# Patient Record
Sex: MALE | Race: WHITE
[De-identification: names, ages, dates, MRNs, and addresses within clinical notes are randomized per-mention and may not be internally consistent; named-entity substitution may affect disease eponyms.]

---

## 2017-01-23 ENCOUNTER — HOSPITAL ENCOUNTER (INPATIENT)
Dept: HOSPITAL 80 - FED | Age: 72
LOS: 9 days | Discharge: NURSING, REHAB OR LONG-TERM CARE | DRG: 519 | End: 2017-02-01
Attending: NEUROLOGICAL SURGERY | Admitting: NEUROLOGICAL SURGERY
Payer: COMMERCIAL

## 2017-01-23 DIAGNOSIS — M80.08XA: Primary | ICD-10-CM

## 2017-01-23 DIAGNOSIS — I10: ICD-10-CM

## 2017-01-23 DIAGNOSIS — E78.5: ICD-10-CM

## 2017-01-23 DIAGNOSIS — E11.621: ICD-10-CM

## 2017-01-23 DIAGNOSIS — M86.9: ICD-10-CM

## 2017-01-23 DIAGNOSIS — Z99.81: ICD-10-CM

## 2017-01-23 DIAGNOSIS — E11.69: ICD-10-CM

## 2017-01-23 DIAGNOSIS — I48.91: ICD-10-CM

## 2017-01-23 DIAGNOSIS — J96.11: ICD-10-CM

## 2017-01-23 DIAGNOSIS — L97.429: ICD-10-CM

## 2017-01-23 DIAGNOSIS — B95.62: ICD-10-CM

## 2017-01-23 DIAGNOSIS — J44.9: ICD-10-CM

## 2017-01-23 DIAGNOSIS — M48.06: ICD-10-CM

## 2017-01-23 LAB
ANION GAP SERPL CALC-SCNC: 10 MEQ/L (ref 8–16)
CALCIUM SERPL-MCNC: 8.9 MG/DL (ref 8.5–10.4)
CHLORIDE SERPL-SCNC: 101 MEQ/L (ref 97–110)
CO2 SERPL-SCNC: 27 MEQ/L (ref 22–31)
CREAT SERPL-MCNC: 0.9 MG/DL (ref 0.7–1.3)
ERYTHROCYTE [DISTWIDTH] IN BLOOD BY AUTOMATED COUNT: 15 % (ref 11.5–15.2)
GFR SERPL CREATININE-BSD FRML MDRD: > 60 ML/MIN/{1.73_M2}
GLUCOSE SERPL-MCNC: 114 MG/DL (ref 70–100)
HCT VFR BLD CALC: 27.4 % (ref 40–51)
HGB BLD-MCNC: 9 G/DL (ref 13.7–17.5)
LIPEMIA HEMOLYSIS FLAG: 80 (ref 0–99)
MCH RBC BLDCO QN: 31.4 PG (ref 27.9–34.1)
MCHC RBC AUTO-ENTMCNC: 32.8 G/DL (ref 32.4–36.7)
MCV RBC AUTO: 95.5 FL (ref 81.5–99.8)
PLATELET # BLD: 222 10^3/UL (ref 150–400)
PLATELET CLUMPS FLAG: 0 (ref 0–99)
POTASSIUM SERPL-SCNC: 4.2 MEQ/L (ref 3.5–5.2)
RBC # BLD AUTO: 2.87 10^6/UL (ref 4.4–6.38)
SODIUM SERPL-SCNC: 138 MEQ/L (ref 134–144)

## 2017-01-23 PROCEDURE — C1894 INTRO/SHEATH, NON-LASER: HCPCS

## 2017-01-23 PROCEDURE — C1713 ANCHOR/SCREW BN/BN,TIS/BN: HCPCS

## 2017-01-23 RX ADMIN — FLUTICASONE PROPIONATE AND SALMETEROL SCH: 50; 500 POWDER RESPIRATORY (INHALATION) at 22:17

## 2017-01-23 RX ADMIN — ERTAPENEM SODIUM SCH MLS: 1 INJECTION, POWDER, LYOPHILIZED, FOR SOLUTION INTRAMUSCULAR; INTRAVENOUS at 21:55

## 2017-01-23 RX ADMIN — ATORVASTATIN CALCIUM SCH MG: 20 TABLET, FILM COATED ORAL at 22:08

## 2017-01-23 RX ADMIN — IPRATROPIUM BROMIDE AND ALBUTEROL SULFATE SCH ML: .5; 3 SOLUTION RESPIRATORY (INHALATION) at 22:13

## 2017-01-23 RX ADMIN — DOCUSATE SODIUM AND SENNOSIDES SCH: 50; 8.6 TABLET ORAL at 22:42

## 2017-01-23 RX ADMIN — Medication SCH UNITS: at 22:07

## 2017-01-23 RX ADMIN — LOSARTAN POTASSIUM SCH MG: 50 TABLET, FILM COATED ORAL at 22:08

## 2017-01-23 NOTE — GCON
[f rep st]



                                                                    CONSULTATION





DATE OF CONSULTATION:  01/23/2017



REFERRING PHYSICIAN:  Dale Basilio MD



REASON FOR CONSULTATION:  Management of medical issues.



HISTORY OF PRESENT ILLNESS:  Patient is a 71-year-old male with history of prior lumbar spine decompr
ession, prediabetes, COPD on chronic oxygen 4 L, presenting with increased back pain.  States he fell
 out of a chair reaching for something on the floor on the evening of the 20th and fell on his right 
side.  He did not hit his head or have loss of consciousness.  He denies overt weakness.  No bowel or
 bladder incontinence.  He had increased pain on the right side the subsequent day that was more mode
rate.  When asked to describe the pain, he could not tell me.  This pain does radiate down the right 
leg and worse with movements.  Minimal pain if he is lying still. 



He has been recently followed by Dr. Sykes with Infectious Disease for a diabetic foot ulcer, in [place]
ch he has had a PICC line and been receiving Invanz.  Patient denies fevers, chills, or sweats.  No c
ough.  No shortness of breath.  No myalgias.  No ill contacts.



REVIEW OF SYSTEMS:  I completed a 10-point review of systems, negative except noted in HPI.



PAST MEDICAL HISTORY:  

1.  Left foot cellulitis, currently treated with Invanz.

2.  COPD.

3.  Chronic hypoxemic respiratory failure with 4 L all the time.  

4.  Prediabetes.

5.  History of atrial fibrillation.

6.  History of gout.



PAST SURGICAL HISTORY:  Decompression lumbar spine, nasoseptal surgery, soft palate surgery, 2 prior 
lumbar surgeries,  vasectomy.



SOCIAL HISTORY:  Lives in [place] with his wife.  He has 2 kids.  He has smoked 2 packs a day for 
40 years, has 2 alcoholic drinks every evening, and no illicits.



FAMILY HISTORY:  Brother with diabetes.



ALLERGIES:  No known drug allergies.



MEDICATIONS ON ADMISSION:  Metformin 1000 mg b.i.d., guaifenesin p.r.n., Accolate 20 mg p.o. b.i.d., 
vancomycin 1.25 g IV q.24, simvastatin 40 mg daily, prednisone 10 mg daily, potassium 20 mEq daily, P
rotonix 40 mg daily, losartan 50 mg p.o. q.h.s., DuoNeb q.i.d., Norco 5/325 one tab p.o. q.4 hours p.
r.n., Lasix 40 mg daily, Advair 1 puff b.i.d., iron 325 mg daily, ertapenem 1 g daily. aspirin 81 mg 
daily, allopurinol 100 mg daily, albuterol p.r.n., Tylenol p.r.n.



PHYSICAL EXAMINATION:  VITAL SIGNS:  Temperature 36.6, blood pressure 143/79, heart rate 83, respirat
ions 16, and 96% on 2 L.  GENERAL:  Patient is sitting up in bed, in no acute distress.  HEENT: PERRL
A, EOMI.  Oropharynx is clear without exudate or ulceration.  CV:  Regular rate rhythm.  No murmurs, 
gallops, or rubs.  LUNGS:  Diminished throughout.  No crackles or wheezes.  ABDOMEN:  Soft, nontender
, nondistended.  Positive bowel sounds.  :  No suprapubic or CVA tenderness.  No Yoo in place.  M
USCULOSKELETAL:  Right upper extremity PICC line without evidence of infection or erythema.  Left low
er extremity with erythema up to the shin with warmth, not tender.  Two ulcers, 1 on the medial aspec
t of the big toe was packed, heel ulcer is healing well with no purulence.



IMAGING:  Chest x-ray, personally reviewed by me:  Small right-sided pleural effusion, query right-si
ded opacity, basilar consolidation consistent with chronic scarring.  Lumbar spine MRI:  Significant 
interval progression in osteoporotic compression fractures from 1 year earlier.  There has been inter
tommy development of severe compression deformity of L4 with associated severe acquired central canal s
tenosis.  Severe compression fracture of L1 is associated with moderate acquired central canal stenos
is.  Acute superior endplate fracture of L5.  Moderate remote compression fracture in the superior en
dplate of L3 and T11.



ASSESSMENT AND PLAN:  

1.  Acute on chronic lumbar pain:  Patient presented with progressive pain with right lower extremity
 paresthesia and weakness.  He has acute deformities of L4 and superior endplate of L5.  Neurosurgery
 plans for L3 through L5 lumbar laminectomy on January 26th, as well as kyphoplasty at L4-L5.  Pain m
anagement per Neurosurgery.  Will hold his aspirin.

2.  History of atrial fibrillation:  Patient is currently in normal sinus rhythm upon my exam.  EKG i
s pending.  He is  anticoagulated with a baby aspirin.  This has been held with upcoming surgery.

3.  Accelerated hypertension:  Will resume losartan and Lasix, but would recommend holding these  day
 of surgery to avoid perioperative hypotension.

4.  Hyperlipidemia:  Continue Zocor.

5.  Left diabetic foot ulcer:  Patient is followed by Dino Sykes with Infectious Disease.  He has be
en receiving vancomycin and meropenem as an outpatient.  Will continue these medications.

6.  Gout:  Continue allopurinol.  Labs are pending.  BMP and CBC are pending.

7.  Controlled diabetes:  Patient on metformin as an outpatient.  Will hold this with upcoming proced
ure and use sliding scale insulin.

8.  Chronic obstructive pulmonary disease:  No evidence of exacerbation.  Will continue home medicati
ons.  Continue inhalers, as well as his prednisone.

9.  Chronic hypoxemic respiratory failure:  Patient normally wears 4 L of oxygen in which now he has 
only requiring 2.  Chest x-ray showed a small pleural effusion and possible pneumonia however, patien
t is asymptomatic without fever.  Labs are pending.  Do not think this is infectious at this point.

10.  Diet:  Diabetic.

11.  Deep venous thrombosis prophylaxis:  SCDs. 

Thank you for this consult.  We will follow along.  Please call with any questions.





Job #:  [id number]

## 2017-01-23 NOTE — DX
Portable Chest   17:08



History: Evaluate pneumonia for pleural effusion



Comparison: June 22, 2016



Findings: There is new patchy right lung infiltrate. Bibasilar consolidation is chronic or recurrent 
and associated with increased blunting of the right costophrenic gutter that likely represents a smal
l pleural effusion. A right arm PICC line is newly present with tip in the superior vena cava. Prior 
left mid lung consolidation has cleared.



Impression: New right lung pneumonia with small right pleural effusion. Basilar consolidation consist
ent with chronic scarring/atelectasis versus recurrent pneumonia.

## 2017-01-23 NOTE — EDPHY
H & P


Stated Complaint: chronic back pain/? fall friday may have reinjured


Source: Patient, Family


Exam Limitations: No limitations





- Personal History


Current Tetanus/Diphtheria Vaccine: Yes


Tetanus Vaccine Date: 2012





- Medical/Surgical History


Hx Asthma: Yes


Hx Chronic Respiratory Disease: Yes


Hx Diabetes: Yes


Hx Cardiac Disease: Yes


Hx Renal Disease: No


Hx Cirrhosis: No


Hx Alcoholism: No


Hx HIV/AIDS: No


Hx Splenectomy or Spleen Trauma: No


Other PMH: nose/vasectomy/ COPD/toe infection/chronic back probl/back surgery





- Social History


Smoking Status: Former smoker


HPI/ROS: 


CHIEF COMPLAINT: Back pain





HISTORY OF PRESENT ILLNESS:  fell on Friday evening injuring his  low back.  At 

that time he had minimal pain.  He had no head injury or loss of consciousness.

  Describes no motor or sensory changes.  However on Saturday he noticed 

increasing pain.  Since that time his pain is increased from mild to moderate.  

It is worse with any palpation of lumbar region.  It radiates down the right 

leg.  Some paresthesia of the right leg.    Worse with palpation or movement.  

Improved with rest. No incontinence of bowel or bladder.  No retention of bowel 

or bladder.  No anesthesia of the leg or motor changes.  Does have a history of 

low back pain status post discopathy with uncertain surgeries in the past.  He 

knows he has no hardware in there.  No injuries elsewhere.  He also has an 

ongoing foot infection that he has no complaints for but is asking to have his 

scheduled antibiotics administered  through his existing PICC line.  He has no 

complaints regarding the foot.  No other associated complaints or modifying 

factors.





REVIEW OF SYSTEMS:


Ten systems reviewed and are negative unless otherwise noted in the HPI





EXAMINATION


General Appearance:  Alert, no distress


Head: normocephalic, atraumatic


Eyes:  Pupils equal and round, no conjunctival pallor or injection


ENT, Mouth:  Mucous membranes moist .  Uvula midline. No lesions.


Neck:  Normal inspection, supple, non-tender


Respiratory:   mild rhonchi.  No wheezing, crackles or consolidation.  No 

distress or diminishment


Cardiovascular:  Regular rate and rhythm.  Pulses intact distally with 

symmetric PT and radial pulses.  Left DP not evaluated due to bandage


Gastrointestinal:  Abdomen is soft and nontender.  Mild, reducible umbilical 

hernia.  Nontender in all quadrants.  Nonacute


Back:  moderate tenderness of the lumbar spine.  No crepitus, step-off or 

deformity.  Range of motion not fully tested secondary to pain


Neurological:  A&O,  paresthesia of the right lower extremity.  Strength is 5/5 

in the right ankle, knee and hip.  No focal deficits.


Skin:    Skin changes of bilateral lower extremities consistent with venous 

stasis.  There is a wound on the left foot that I did not examine due to clean 

bandage.  He has no complaints regarding this.


Extremities:  Nontender,   Symmetric 1+ pedal edema


Psychiatric:  Mood and affect normal





DIFFERENTIAL DIAGNOSES:


Including but not limited to 





MDM:


11:00 a.m.


 fall with lower back pain, right-sided radiculopathy and mild hyper 

paresthesia.  He has no weakness of the right lower extremity.  No incontinence 

of bowel or bladder.  No retention of bowel or bladder.  No paralysis.  Given 

his age, chronicity of back pain, in the presence of an fall with symptoms as 

above, have gone straight to MRI of the low back.  Patient's bowels are 

comfortable with this plan.  Additionally he has ongoing infection of the foot, 

the PICC line in place.  I have given any verbal for him to receive his 

scheduled medications.  We will give him pain medication to get the MRI. he is 

also comfortable with this plan.





12:52 p.m.


 notified by Radiology that there are significant findings on the MRI as listed 

in his report.  The most acute L5 fracture with severe stenosis of the canal 

due to retropulsion.  There are changes at L1, L3 as for as well as noted.  We 

did review these.  I did immediately paged Neurosurgery at 12:56 p.m..  We are 

waiting their call this time.





  1:23 p.m.


 spoke with ARMANDO Crawford,  with Neurosurgery.  She was made aware of the 

significant findings on the MRI of the lumbar spine.  She informed that she 

will have someone come see the patient here in the emergency department.





1:42 p.m.


 Dr. Basilio at bedside examining the patient at this time.





  2:20 p.m.


 notified by Neurosurgery that they will admit the patient due to these 

findings.  They are requesting medicine consult as the plan for surgery on 

Friday.  I discussed the case with the hospitalist, and Dr. Betancourt will 

provide consultation.  He is admitted in stable condition











SUPERVISION: This patient was independently evaluated without the aide of 

supervising physician. (Shyam Read)





Constitutional: 





 Initial Vital Signs











Temperature (C)  36.4 C   01/23/17 09:57


 


Heart Rate  86   01/23/17 09:57


 


Respiratory Rate  22 H  01/23/17 09:57


 


Blood Pressure  151/78 H  01/23/17 09:57


 


O2 Sat (%)  90 L  01/23/17 09:57








 











O2 Delivery Mode               Nasal Cannula

















Allergies/Adverse Reactions: 


 





No Known Allergies Allergy (Verified 01/23/17 09:56)


 








Home Medications: 














 Medication  Instructions  Recorded


 


Prednisone 10 mg PO DAILY 07/28/11


 


SIMVASTATIN [Zocor] 40 mg PO HS 07/28/11


 


Zafirlukast [Accolate 20 MG] 20 mg PO BID@1000,2000 07/28/11


 


metFORMIN HCL [Glucophage 500 mg 1,000 mg PO BID 07/28/11





(*)]  


 


Aspirin [Aspirin 81mg (*)] 81 mg PO HS 07/25/15


 


Cholecalciferol (Vitamin D3) 4,000 unit PO HS 07/25/15





[Vitamin D3]  


 


Fluticasone/Salmeter 500/50Mcg 1 puffs IH BID 07/25/15





[Advair 500/50 (*)]  


 


Albuterol [Proventil Inhaler HFA 1 - 2 puffs IH DAILY PRN 12/16/16





(*)]  


 


Allopurinol [Allopurinol 100  mg PO DAILY 12/16/16





(*)]  


 


Ferrous Sulfate [Ferrous Sulf 325 325 mg PO DAILY 12/16/16





MG (*)]  


 


Furosemide [Lasix 40 MG (*)] 40 mg PO DAILY 12/16/16


 


Ipratropium/Albuterol [Duoneb (*)] 3 ml IH QID 12/16/16


 


Losartan Potassium [Cozaar 50 mg 50 mg PO HS 12/16/16





(*)]  


 


Pantoprazole Sodium [Protonix 40mg 40 mg PO DAILY 12/16/16





(*)]  


 


Potassium Cl [Klor-Con 20 meq (*)] 20 meq PO DAILY 12/16/16


 


guaiFENesin [Mucinex 600 MG (*)] 1,200 mg PO DAILY PRN 12/16/16


 


Acetaminophen [Tylenol 325mg (*)] 650 mg PO Q4HRS PRN #0 tab 12/18/16


 


Ertapenem [INVanz] 1 gm IV DAILY #0 vial 12/18/16


 


Hydrocodone/APAP 5/325 [Norco 1 tab PO Q4HRS PRN #20 tab 12/18/16





5/325 (*)]  


 


Vancomycin [Vancomycin (*)] 1.25 gm IV Q24H #0 vial 12/18/16














Medical Decision Making


Other Provider: 


The patient was evaluated and managed by the Physician Assistant/ Nurse 

Practitioner.  I discussed the patient's presentation and course with the 

midlevel provider with them and agree with the evaluation.  My co-signature 

indicates that I have reviewed this chart and I agree with the findings and 

plan of care as documented.  I am the secondary supervising physician. (Bonnie Baker)








- Data Points


Medications Given: 





 








Discontinued Medications





Morphine Sulfate (Morphine)  4 mg IVP EDNOW ONE


   Stop: 01/23/17 11:02


   Last Admin: 01/23/17 11:34 Dose:  4 mg


Ondansetron HCl (Zofran)  4 mg IVP EDNOW ONE


   Stop: 01/23/17 11:02


   Last Admin: 01/23/17 11:35 Dose:  4 mg











Departure





- Departure


Disposition: [facility] Inpatient Acute


Clinical Impression: 


 Closed compression fracture of L1 lumbar vertebral body, Compression fracture 

of L3 lumbar vertebra, Compression fracture of L4 lumbar vertebra


Condition: Good

## 2017-01-23 NOTE — MR
MRI of the Lumbar Spine (Without Contrast)



Clinical Indications: Evaluate multiple lumbar compression fractures. Radiculopathy post trauma.



Technique:  Sagittal and axial T1 and T2 MR sequences of the lumbar spine without contrast.



Comparison examination: Lateral lumbar spine radiograph December 23, 2015.



Findings:  From prior examination, there is been significant interval change in appearance of the lum
bar spine over one year. There has been interval development of severe compression fractures of L1 an
d L4 with fractures through the centrum of both vertebral bodies. Retropulsion of bone at both levels
 contributes to moderate central stenosis at L1 and severe central stenosis at L4. A moderate debi
tevin deformity of the superior endplate of L3 is also new from the prior examination, but subacute in
 age. There is an acute mild compression deformity of the superior endplate of L4. Moderate compressi
on deformity of the T11 vertebral body appears remote in age.



L1-L2: No disk herniation. Retropulsion of bone from L1 compression deformity contributes to moderate
 acquired central canal stenosis.



L2-L3: No disk herniation or stenosis.



L3-L4: Broad-based annular bulging. Retropulsion of bone from the severe L4 compression deformity cre
ates severe acquired central canal stenosis. There is marked crowding of the cauda equina within the 
thecal space..



L4-L5: No disk herniation or stenosis.



L5-S1: No disk herniation or stenosis.



Impression:  Significant interval progression and osteoporotic compression fractures from one year ea
rlier. There has been interval development of severe compression deformity of L4 with associated asaf
re acquired central canal stenosis. Severe compression fracture of L1 is associated with moderate acq
uired central canal stenosis. Acute superior endplate fracture of L5 is present without retropulsion.
 Moderate remote compression fracture of the superior endplate of L3 and T11..



 Results called to Shyam Read PA-C, at the time of the interpretation..

## 2017-01-23 NOTE — GHP
[f 
rep st]



                                                            HISTORY AND PHYSICAL





DATE OF ADMISSION:  01/23/2017



REASON FOR EVALUATION:  Worsening low back pain with right lower extremity 
weakness and pain status post fall.



HISTORY OF PRESENT ILLNESS:  The patient is well known to me from prior lumbar 
surgery.  He comes today because he states that on the evening of the 20th January 2017 he fell onto his back.  He had minimal back pain at that time, but 
ongoing chronic low back pain as he has had for several months.  No associated 
head injury or loss of consciousness, and no loss of bowel or bladder function 
or weakness or numbness and tingling of the lower extremities.  He noticed 
increasing pain on the subsequent day that went from mild to moderate.  He had 
extremely worse pain with palpation of the lumbar spine.  The pain is radiating 
down the entire right leg in a nondermatomal distribution.  Pain is worse with 
movement and palpation and improves with rest.  No associated left lower 
extremity symptoms.  He has never had symptoms like this in the past.  He has 
been doing quite well and recently has been treated for a foot infection for 
which he has been getting treatment through his PICC line.



REVIEW OF SYSTEMS:  A 10-point review of systems on the patient intake form 
reviewed by myself, significant only for those noted above in the HPI.



ALLERGIES:  No known drug allergies.



MEDICATIONS:  

1.  Prednisone 10 mg p.o. daily.

2.  Zocor 40 mg p.o. q.h.s.

3.  Zafirlukast 20 mg p.o. b.i.d.

4.  Metformin 1000 mg p.o. b.i.d. 

5.  Aspirin 81 mg p.o. q.h.s.

6.  Vitamin D 4000 units p.o. q.h.s. 

7.  Advair 500/50 one puff inhaled b.i.d. 

8.  Albuterol 1-2 puffs daily p.r.n.

9.  Allopurinol 100 mg p.o. daily.

10.  Ferrous sulfate 325 mg p.o. daily.

11.  Lasix 40 mg p.o. daily.

12.  Albuterol 3 mL inhaled q.i.d. 

13.  Cozaar 50 mg p.o. q.h.s. 

14.  Pantoprazole 40 mg p.o. daily.

15.  Potassium chloride 20 mEq p.o. daily.

16.  Guaifenesin 1200 mg p.o. daily p.r.n.

17.  Tylenol 325 mg p.o. daily.

18.  Invanz 1 g IV daily.

19.  Norco 5/325 p.o. daily.

20.  Vancomycin 1.25 g IV q.24 hours.



FAMILY HISTORY:  Negative for any cranial aneurysms or brain tumors.



PAST MEDICAL HISTORY:  Significant for asthma, chronic respiratory disease, 
diabetes, cardiac disease.



PAST SURGICAL HISTORY:  

1.  History of prior decompression in the lumbar spine.

2.  History of toe infection with recent surgery.

3.  Vasectomy.



SOCIAL HISTORY:  The patient is  and accompanied by his wife in the room 
today.  Denies any tobacco use.



PHYSICAL EXAMINATION:  VITAL SIGNS:  Temperature 97.5, heart rate 86, 
respiratory rate 22, blood pressure 151/78, satting at 90% and currently on 2 L 
nasal cannula oxygen.  GENERAL:  The patient is laying in bed in no acute 
distress.  He is quite pleasant and cooperative with the examination.  Wife is 
at bedside.  Mood and affect are appropriate.  HEENT:  Head is atraumatic, 
normocephalic.  Pupils are equal, round, reactive to light bilaterally.  
Oropharynx is moist. CARDIOVASCULAR/PULMONARY:  Deferred.  NEUROLOGIC:  Motor 
exam:  He has 5/5 strength with bilateral  strength, biceps, triceps, 
bilateral hip flexion, knee flexion, extension, and right-sided plantar 
dorsiflexion.  Left ankle is not tested secondary to it being in a bandage from 
his prior surgery.  Sensory exam is intact with sensation to light touch 
throughout all major dermatomes of bilateral upper and lower extremities 
throughout, with diminished sensation slightly in the bilateral stocking 
distribution.  SKIN:  He has a wound on the left which is currently covered by 
a bandage which was not removed.  Other, he has no Canchola's. No Babinski.  
Other, gait and station are deferred.  Neurologic cranial nerves 2-12 are 
otherwise intact.  Face symmetric.  Tongue protrudes in midline.  Uvula and 
palate elevate symmetrically.  Symmetric sensation on the face to light touch.  
Hearing is intact to light conversation.  Shoulder shrug is symmetric.



MEDICAL DECISION MAKING:  Patient underwent an MRI scan of the lumbar spine 
without contrast completed on the 23rd of January 2017, reviewed by myself on 
the [facility] PACS system.  There is significant 
interprogression osteoporotic compression deformity with severe compression 
deformity of L4 with associated severe acquired central canal stenosis.  There 
is severe compression deformity of L1 with moderate acquired central canal 
stenosis.  There is acute superior endplate fracture of L5 without 
retropulsion.  There are remote compression deformities of the superior 
endplates of L3 and T11.



ASSESSMENT AND PLAN:  The patient is a 71-year-old gentleman with multiple 
medical comorbidities including  utilization of prednisone for chronic 
obstructive pulmonary disease, who presents now with worsening low back pain 
with right lower extremity paresthesias and weakness.  On clinical examination, 
he appears to have a normal clinical examination; though, he is having 
difficulty with ambulation secondary to both his back pain and right lower 
extremity pain.  He has acute deformities of the L4 and superior endplate of L5 
and questionable L1 endplates with severe acquired stenosis at the L4 level.  
At this point, the patient's pain is poorly controlled and he is not able to be 
ambulatory or be discharged home given his age and comorbidities as well as 
neurological findings.  At this point, we will admit him to the hospital for 
likely surgical intervention.  I discussed with him the risks, benefits, and  
treatment alternatives, including no intervention.  Will have the medicine 
service consult on the patient and get him cleared for surgery.  We have got 
him booked for surgery on the 26th of January for an L3-L5 lumbar laminectomy 
with kyphoplasty of both L4 and L5.  We will hold his aspirin in the meantime 
and have the medical service ensure that he gets his antibiotics and gets him 
teed up from a medical standpoint for surgery.  



This was discussed with the patient's wife and patient in the ER today, and 
both are in agreement.





Job #:  [id number]

MTDD

## 2017-01-24 LAB
ANION GAP SERPL CALC-SCNC: 6 MEQ/L (ref 8–16)
APTT BLD: 30.1 SEC (ref 23–38)
CALCIUM SERPL-MCNC: 8.9 MG/DL (ref 8.5–10.4)
CHLORIDE SERPL-SCNC: 103 MEQ/L (ref 97–110)
CO2 SERPL-SCNC: 29 MEQ/L (ref 22–31)
CREAT SERPL-MCNC: 0.9 MG/DL (ref 0.7–1.3)
ERYTHROCYTE [DISTWIDTH] IN BLOOD BY AUTOMATED COUNT: 14.6 % (ref 11.5–15.2)
GFR SERPL CREATININE-BSD FRML MDRD: > 60 ML/MIN/{1.73_M2}
GLUCOSE SERPL-MCNC: 72 MG/DL (ref 70–100)
HCT VFR BLD CALC: 25.7 % (ref 40–51)
HGB BLD-MCNC: 8.6 G/DL (ref 13.7–17.5)
INR PPP: 1.13 (ref 0.83–1.16)
LIPEMIA HEMOLYSIS FLAG: 80 (ref 0–99)
MCH RBC BLDCO QN: 32.3 PG (ref 27.9–34.1)
MCHC RBC AUTO-ENTMCNC: 33.5 G/DL (ref 32.4–36.7)
MCV RBC AUTO: 96.6 FL (ref 81.5–99.8)
PLATELET # BLD: 221 10^3/UL (ref 150–400)
PLATELET CLUMPS FLAG: 0 (ref 0–99)
POTASSIUM SERPL-SCNC: 4.4 MEQ/L (ref 3.5–5.2)
PROTHROMBIN TIME: 14.4 SEC (ref 12–15)
RBC # BLD AUTO: 2.66 10^6/UL (ref 4.4–6.38)
SODIUM SERPL-SCNC: 138 MEQ/L (ref 134–144)

## 2017-01-24 RX ADMIN — MONTELUKAST SODIUM SCH MG: 10 TABLET, COATED ORAL at 17:37

## 2017-01-24 RX ADMIN — IPRATROPIUM BROMIDE AND ALBUTEROL SULFATE SCH ML: .5; 3 SOLUTION RESPIRATORY (INHALATION) at 06:06

## 2017-01-24 RX ADMIN — FUROSEMIDE SCH: 40 TABLET ORAL at 08:56

## 2017-01-24 RX ADMIN — IPRATROPIUM BROMIDE AND ALBUTEROL SULFATE SCH ML: .5; 3 SOLUTION RESPIRATORY (INHALATION) at 17:30

## 2017-01-24 RX ADMIN — DOCUSATE SODIUM AND SENNOSIDES SCH: 50; 8.6 TABLET ORAL at 08:56

## 2017-01-24 RX ADMIN — FLUTICASONE PROPIONATE AND SALMETEROL SCH: 50; 500 POWDER RESPIRATORY (INHALATION) at 08:56

## 2017-01-24 RX ADMIN — INSULIN LISPRO SCH: 100 INJECTION, SOLUTION INTRAVENOUS; SUBCUTANEOUS at 08:55

## 2017-01-24 RX ADMIN — Medication SCH UNITS: at 19:40

## 2017-01-24 RX ADMIN — INSULIN LISPRO SCH UNITS: 100 INJECTION, SOLUTION INTRAVENOUS; SUBCUTANEOUS at 16:19

## 2017-01-24 RX ADMIN — IPRATROPIUM BROMIDE AND ALBUTEROL SULFATE SCH ML: .5; 3 SOLUTION RESPIRATORY (INHALATION) at 13:35

## 2017-01-24 RX ADMIN — PANTOPRAZOLE SODIUM SCH MG: 40 TABLET, DELAYED RELEASE ORAL at 08:54

## 2017-01-24 RX ADMIN — LOSARTAN POTASSIUM SCH MG: 50 TABLET, FILM COATED ORAL at 19:40

## 2017-01-24 RX ADMIN — IRON SUPPLEMENT SCH MG: 325 TABLET ORAL at 08:53

## 2017-01-24 RX ADMIN — OXYCODONE HYDROCHLORIDE AND ACETAMINOPHEN PRN TAB: 5; 325 TABLET ORAL at 11:32

## 2017-01-24 RX ADMIN — ALLOPURINOL SCH MG: 100 TABLET ORAL at 08:53

## 2017-01-24 RX ADMIN — ATORVASTATIN CALCIUM SCH MG: 20 TABLET, FILM COATED ORAL at 19:40

## 2017-01-24 RX ADMIN — FAMOTIDINE SCH MG: 20 TABLET, FILM COATED ORAL at 08:54

## 2017-01-24 RX ADMIN — FAMOTIDINE SCH MG: 20 TABLET, FILM COATED ORAL at 19:40

## 2017-01-24 RX ADMIN — FLUTICASONE PROPIONATE AND SALMETEROL SCH: 50; 500 POWDER RESPIRATORY (INHALATION) at 21:55

## 2017-01-24 RX ADMIN — INSULIN LISPRO SCH: 100 INJECTION, SOLUTION INTRAVENOUS; SUBCUTANEOUS at 12:21

## 2017-01-24 RX ADMIN — IPRATROPIUM BROMIDE AND ALBUTEROL SULFATE SCH ML: .5; 3 SOLUTION RESPIRATORY (INHALATION) at 21:54

## 2017-01-24 RX ADMIN — ERTAPENEM SODIUM SCH MLS: 1 INJECTION, POWDER, LYOPHILIZED, FOR SOLUTION INTRAMUSCULAR; INTRAVENOUS at 22:16

## 2017-01-24 RX ADMIN — DOCUSATE SODIUM AND SENNOSIDES SCH: 50; 8.6 TABLET ORAL at 22:42

## 2017-01-24 NOTE — HOSPPROG
Hospitalist Progress Note


Assessment/Plan: 


#Lumbar back pain


-controlled on current regimen


-bowel regimen


-plan for surgery Thursday





#Prediabetes


-hold metformin. SSI





#Benign HTN


-cont home meds


-Hold ACEI and LAsix day of surgery





#Gout


-allopurinol





#Diabetic foot infection


-was accidentally dosed Envanz/Vanc 2 x yesterday. He received doses at home by 

his wife. Med rec reflected that he had last dose on Sunday. 


-Random level today 22. Hold tonight's dose. Trough in morning. Kidney function 

normal. Redose in morning


-cont Envanz





#Chronic hypoxemic resp failure


-due to COPD. No e/e exacerbation


-questionable PNA on CXR, but he does not endorse any symptoms, thus no abx





#Atrial fibrillation


-EKG with NSR, 1st degree block. No concerning arrhythmia.  Restart ASA after 

surgery when ok by NSGY





#Diet: DM





#DVT ppx: SCDs





#Disp: cont IV abx. 


Subjective: pain controlled currently. Denies fever, cough, SOB


Objective: 


 Vital Signs











Temp Pulse Resp BP Pulse Ox


 


 36.7 C   82   16   110/64   90 L


 


 01/24/17 07:24  01/24/17 07:24  01/24/17 07:24  01/24/17 07:24  01/24/17 07:24








 Laboratory Results





 01/24/17 04:50 





 01/24/17 04:50 





 











 01/23/17 01/24/17 01/25/17





 05:59 05:59 05:59


 


Intake Total  1200 


 


Output Total  1050 


 


Balance  150 








 











PT  14.4 SEC (12.0-15.0)   01/24/17  04:50    


 


INR  1.13  (0.83-1.16)   01/24/17  04:50    














- Physical Exam


Constitutional: no apparent distress


Eyes: PERRL


Ears, Nose, Mouth, Throat: moist mucous membranes, hearing normal


Cardiovascular: regular rate and rhythym, no murmur, rub, or gallop


Respiratory: no respiratory distress, reduced air movement


Gastrointestinal: normoactive bowel sounds, soft, non-tender abdomen


Skin: warm


Musculoskeletal: other (left foot ulcers wrapped. Mild erythema over shin)


Neurologic: AAOx3





ICD10 Worksheet


Patient Problems: 


 Problems











Problem Status Diagnosed


 


Closed compression fracture of L1 lumbar vertebral body Acute 


 


Compression fracture of L3 lumbar vertebra Acute 


 


Compression fracture of L4 lumbar vertebra Acute 


 


Cellulitis of left foot Acute 


 


MRSA (methicillin resistant Staphylococcus aureus) Acute 12/30/16

## 2017-01-24 NOTE — CPEKG
Heart Rate: 69

RR Interval: 870

P-R Interval: 248

QRSD Interval: 90

QT Interval: 416

QTC Interval: 446

P Axis: 3

QRS Axis: 14

T Wave Axis: 46

EKG Severity - ABNORMAL ECG -

EKG Impression: SINUS RHYTHM

EKG Impression: FIRST DEGREE AV BLOCK

EKG Impression: LOW VOLTAGE THROUGHOUT

Electronically Signed By: Ky Sofia 25-Jan-2017 08:06:16

## 2017-01-24 NOTE — NEUSURGPN
Assessment/Plan: 


70 y/o male with lumbar stenosis and lumbar compression fractures (L4 and L5)








-Discussed the risks, benefits and alternatives to an L3-L5 laminectomy with L4 

and L5 kyphoplasty. Consents signs and placed on chart. Questions answered. 


- Appreciate medicine clearance for surgery for Thursday


-Optimize pain management


-Discussed with Dr. Basilio


-Please notify NS with any change in neuro/motor exam


Subjective: 


low back pain


Objective: 


NAD A&Ox3 MAEx4 5/5 and equal in BUE and BLE.





- Physician


Discussed Patient with : Praful





Neurosurgery Physical Exam





- Vitals, I&O, Labs





 I and O











 01/23/17 01/24/17 01/25/17





 05:59 05:59 05:59


 


Intake Total  1200 


 


Output Total  1050 


 


Balance  150 


 


Weight  77.111 kg 


 


Intake:   


 


  Oral (ml)  800 


 


  IV Infused (ml)  400 


 


    Famotidine 20 mg/NaCl 50  50 





    ml @ 200 mls/hr IV Q12HRS   





    LAVERNE Rx#:[id number]   


 


    Ertapenem 1 gm In Ns 100  100 





    ml @ 200 mls/hr IV   





    DAILY21 LAVERNE Rx#:   





    [id number]   


 


    Vancomycin 1.25 gm In D5w  250 





    250 ml @ 166.667 mls/hr   





    IV Q24H LAVERNE Rx#:   





    [id number]   


 


Output:   


 


  Urine (ml)  1050 


 


    Urinal  450 


 


Other:   


 


  Number of Stools   


 


    Incontinence  1 








 Vital Signs











Temp Pulse Resp BP Pulse Ox


 


 36.7 C   82   16   110/64   90 L


 


 01/24/17 07:24  01/24/17 07:24  01/24/17 07:24  01/24/17 07:24  01/24/17 07:24








 Laboratory Results





 01/24/17 04:50 





 01/24/17 04:50 











ICD10 Worksheet


Patient Problems: 


 Problems











Problem Status Diagnosed


 


Closed compression fracture of L1 lumbar vertebral body Acute 


 


Compression fracture of L3 lumbar vertebra Acute 


 


Compression fracture of L4 lumbar vertebra Acute 


 


Cellulitis of left foot Acute 


 


MRSA (methicillin resistant Staphylococcus aureus) Acute 12/30/16

## 2017-01-25 LAB
ANION GAP SERPL CALC-SCNC: 5 MEQ/L (ref 8–16)
CALCIUM SERPL-MCNC: 8.8 MG/DL (ref 8.5–10.4)
CHLORIDE SERPL-SCNC: 105 MEQ/L (ref 97–110)
CO2 SERPL-SCNC: 27 MEQ/L (ref 22–31)
CREAT SERPL-MCNC: 0.9 MG/DL (ref 0.7–1.3)
ERYTHROCYTE [DISTWIDTH] IN BLOOD BY AUTOMATED COUNT: 14.8 % (ref 11.5–15.2)
GFR SERPL CREATININE-BSD FRML MDRD: > 60 ML/MIN/{1.73_M2}
GLUCOSE SERPL-MCNC: 84 MG/DL (ref 70–100)
HCT VFR BLD CALC: 26.3 % (ref 40–51)
HGB BLD-MCNC: 8.7 G/DL (ref 13.7–17.5)
LIPEMIA HEMOLYSIS FLAG: 80 (ref 0–99)
MCH RBC BLDCO QN: 31.8 PG (ref 27.9–34.1)
MCHC RBC AUTO-ENTMCNC: 33.1 G/DL (ref 32.4–36.7)
MCV RBC AUTO: 96 FL (ref 81.5–99.8)
PLATELET # BLD: 229 10^3/UL (ref 150–400)
PLATELET CLUMPS FLAG: 0 (ref 0–99)
POTASSIUM SERPL-SCNC: 4.5 MEQ/L (ref 3.5–5.2)
RBC # BLD AUTO: 2.74 10^6/UL (ref 4.4–6.38)
SODIUM SERPL-SCNC: 137 MEQ/L (ref 134–144)

## 2017-01-25 RX ADMIN — DOCUSATE SODIUM AND SENNOSIDES SCH: 50; 8.6 TABLET ORAL at 23:33

## 2017-01-25 RX ADMIN — INSULIN LISPRO SCH: 100 INJECTION, SOLUTION INTRAVENOUS; SUBCUTANEOUS at 18:27

## 2017-01-25 RX ADMIN — INSULIN LISPRO SCH: 100 INJECTION, SOLUTION INTRAVENOUS; SUBCUTANEOUS at 08:30

## 2017-01-25 RX ADMIN — FAMOTIDINE SCH MG: 20 TABLET, FILM COATED ORAL at 20:31

## 2017-01-25 RX ADMIN — IPRATROPIUM BROMIDE AND ALBUTEROL SULFATE SCH ML: .5; 3 SOLUTION RESPIRATORY (INHALATION) at 10:07

## 2017-01-25 RX ADMIN — ALLOPURINOL SCH MG: 100 TABLET ORAL at 09:13

## 2017-01-25 RX ADMIN — SODIUM CHLORIDE SCH MLS: 900 INJECTION, SOLUTION INTRAVENOUS at 22:04

## 2017-01-25 RX ADMIN — ATORVASTATIN CALCIUM SCH MG: 20 TABLET, FILM COATED ORAL at 20:31

## 2017-01-25 RX ADMIN — IPRATROPIUM BROMIDE AND ALBUTEROL SULFATE SCH ML: .5; 3 SOLUTION RESPIRATORY (INHALATION) at 06:24

## 2017-01-25 RX ADMIN — FLUTICASONE PROPIONATE AND SALMETEROL SCH: 50; 500 POWDER RESPIRATORY (INHALATION) at 10:07

## 2017-01-25 RX ADMIN — IPRATROPIUM BROMIDE AND ALBUTEROL SULFATE SCH ML: .5; 3 SOLUTION RESPIRATORY (INHALATION) at 21:22

## 2017-01-25 RX ADMIN — FUROSEMIDE SCH: 40 TABLET ORAL at 12:58

## 2017-01-25 RX ADMIN — FAMOTIDINE SCH MG: 20 TABLET, FILM COATED ORAL at 09:14

## 2017-01-25 RX ADMIN — IRON SUPPLEMENT SCH MG: 325 TABLET ORAL at 09:13

## 2017-01-25 RX ADMIN — Medication SCH UNITS: at 20:31

## 2017-01-25 RX ADMIN — ERTAPENEM SODIUM SCH MLS: 1 INJECTION, POWDER, LYOPHILIZED, FOR SOLUTION INTRAMUSCULAR; INTRAVENOUS at 20:32

## 2017-01-25 RX ADMIN — LOSARTAN POTASSIUM SCH MG: 50 TABLET, FILM COATED ORAL at 20:31

## 2017-01-25 RX ADMIN — FLUTICASONE PROPIONATE AND SALMETEROL SCH: 50; 500 POWDER RESPIRATORY (INHALATION) at 21:23

## 2017-01-25 RX ADMIN — PANTOPRAZOLE SODIUM SCH MG: 40 TABLET, DELAYED RELEASE ORAL at 09:14

## 2017-01-25 RX ADMIN — IPRATROPIUM BROMIDE AND ALBUTEROL SULFATE SCH ML: .5; 3 SOLUTION RESPIRATORY (INHALATION) at 16:26

## 2017-01-25 RX ADMIN — Medication SCH MLS: at 10:02

## 2017-01-25 RX ADMIN — HYDROCODONE BITARTRATE AND ACETAMINOPHEN PRN TAB: 5; 325 TABLET ORAL at 09:13

## 2017-01-25 RX ADMIN — MONTELUKAST SODIUM SCH MG: 10 TABLET, COATED ORAL at 20:30

## 2017-01-25 RX ADMIN — INSULIN LISPRO SCH UNITS: 100 INJECTION, SOLUTION INTRAVENOUS; SUBCUTANEOUS at 12:46

## 2017-01-25 RX ADMIN — DOCUSATE SODIUM AND SENNOSIDES SCH TAB: 50; 8.6 TABLET ORAL at 09:13

## 2017-01-25 NOTE — HOSPPROG
Hospitalist Progress Note


Assessment/Plan: 


#Lumbar back pain


-controlled on current regimen


-bowel regimen


-plan for surgery tomorrow





#Prediabetes


-hold metformin. SSI





#Benign HTN


-cont home meds


-pt declining his Lasix. Hold ACE-I day of surgery





#Gout


-allopurinol





#Diabetic foot infection


-was accidentally dosed Envanz/Vanc 2 x 1/23. He received doses at home by his 

wife. 


-Trough today 14, so redose vanc and cont Invanz. Wound care following





#Chronic hypoxemic resp failure


-due to COPD. No e/e exacerbation


-questionable PNA on CXR, but he does not endorse any symptoms, thus no abx





#Atrial fibrillation


-EKG with NSR, 1st degree block. No concerning arrhythmia.  Restart ASA after 

surgery when ok by NSGY





#Diet: DM, NPO for surgery


#DVT ppx: SCDs





#Disp: cont IV abx. 





Subjective: refused PT due to pain


Objective: 


 Vital Signs











Temp Pulse Resp BP Pulse Ox


 


 36.4 C   60   16   132/72 H  93 


 


 01/25/17 12:00  01/25/17 12:00  01/25/17 12:00  01/25/17 12:00  01/25/17 12:00








 Laboratory Results





 01/25/17 04:30 





 01/25/17 04:30 





 











 01/24/17 01/25/17 01/26/17





 05:59 05:59 05:59


 


Intake Total 1200 1275 500


 


Output Total 1050 1400 


 


Balance 150 -125 500








 











PT  14.4 SEC (12.0-15.0)   01/24/17  04:50    


 


INR  1.13  (0.83-1.16)   01/24/17  04:50    














- Physical Exam


Constitutional: no apparent distress, other (sitting up in chair)


Eyes: PERRL


Ears, Nose, Mouth, Throat: moist mucous membranes


Cardiovascular: regular rate and rhythym, no murmur, rub, or gallop


Respiratory: no respiratory distress, no rales or rhonchi


Gastrointestinal: normoactive bowel sounds, soft, non-tender abdomen


Genitourinary: no bladder fullness


Skin: warm


Musculoskeletal: other (right-sided lumbar TTP, weakness in right leg)


Neurologic: AAOx3


Psychiatric: interacting appropriately





ICD10 Worksheet


Patient Problems: 


 Problems











Problem Status Diagnosed


 


Closed compression fracture of L1 lumbar vertebral body Acute 


 


Compression fracture of L3 lumbar vertebra Acute 


 


Compression fracture of L4 lumbar vertebra Acute 


 


Cellulitis of left foot Acute 


 


MRSA (methicillin resistant Staphylococcus aureus) Acute 12/30/16

## 2017-01-25 NOTE — WOCRNPDOC
MARIO ALBERTO Advanced Assessment Note





- Skin Integrity Problem, Advanced Assess





  ** Left First Metatarsal Head Surgical Wound/Incision


Dressing Type: Gauze (packing and covering)


Dressing Description: Intact, Shadowed


Exudate Amount: Scant


Exudate Color: Reddish/Yellow


Exudate Characteristic(s): Serosanguinous


Integumentary Issue Intervention: Dressing Changed


Kailee Wound Tissue: Intact


Kailee Wound Swelling: None


Wound Bed Color: Red


Wound Bed Constitution: Granulation Tissue


Wound Edges: Well Defined


Site Odor: None


Site Measurement - Head-to-Toe Length X Width X Depth (cm): 2.1cmx1.6cmx0.8cm


Skin Integrity Problem Comment: Surgical wound w/ gauze packing noted to 1st 

metatarsal head. Surgery was performed on 12/30 outpatient, and patient is 

currently hospitalized for an unrelated reason. Packing removed, wound has 

copious granulation tissue throughout. There is no erythema kailee-wound, and no 

associated swelling. Wound packed w/ Algidex Ag 1/4 inch gauze, and covered w/ 

Allevyn Life dressing. Report given to Staff KIMBERLY Braun.





  ** Left Heel Pressure Injury


Dressing Type: Allevyn Life


Dressing Description: Clean/Dry, Intact


Exudate Amount: Scant


Exudate Color: Reddish/Yellow


Exudate Characteristic(s): Serosanguinous


Integumentary Issue Intervention: Visualized Under Dressing


Kailee Wound Tissue: Blanching, Intact


Kailee Wound Swelling: Mild


Wound Bed Color: Red


Wound Bed Constitution: Smooth Tissue


Wound Edges: Epithelizing


Site Odor: None


Site Measurement - Head-to-Toe Length X Width X Depth (cm): 1.6cmx0.3cmx0.1cm


Pressure Injury Present on Admit: Yes (healing pressure injury; patient 

developed this in rehab facility.)


Skin Integrity Problem Comment: Healing pressure injury noted on L heel, linear 

in appearance and epthihelializing throughout. No erythema or associated 

swelling kailee-wound. Per outpatient tx, will order application of Lanny 

Promogran collagen to be applied while he is here. Report given to Staff KIMBERLY Braun.

## 2017-01-25 NOTE — NEUSURGPN
Assessment/Plan: 


72 y/o male with lumbar stenosis at L1 and L4 and lumbar compression fractures (

L4 and L5). Right leg pain.








-Scheduled on Thursday for L3-L5 laminectomy with L4 and L5 kyphoplasty. 

Consents signed for L3-5 laminectomy and L4 and L5 kyphoplasty.


 


- Appreciate medicine clearance for surgery for Thursday


-Optimize pain management


-Please notify NS with any change in neuro/motor exam








Subjective: 


 


low back pain





Objective: 


 


NAD A&Ox3 MAEx4 5/5 and equal in BUE and BLE.Subjective weakness in right leg. 

Sens +LT in bilat LE


Left foot wrapped in gauze


Urinary Catheter in Place: No





Neurosurgery Physical Exam





- Vitals, I&O, Labs





 I and O











 01/24/17 01/25/17 01/26/17





 05:59 05:59 05:59


 


Intake Total 1200 1275 500


 


Output Total 1050 1400 


 


Balance 150 -125 500


 


Weight 77.111 kg  


 


Intake:   


 


  Oral (ml) 800 1175 500


 


  IV Infused (ml) 400 100 


 


    Famotidine 20 mg/NaCl 50 50  





    ml @ 200 mls/hr IV Q12HRS   





    LAVERNE Rx#:[id number]   


 


    Ertapenem 1 gm In Ns 100 100 100 





    ml @ 200 mls/hr IV   





    DAILY21 LAVERNE Rx#:   





    [id number]   


 


    Vancomycin 1.25 gm In D5w 250  





    250 ml @ 166.667 mls/hr   





    IV Q24H LAVERNE Rx#:   





    [id number]   


 


Output:   


 


  Urine (ml) 1050 1400 


 


    Incontinence  300 


 


    Urinal 450 1100 


 


Other:   


 


  Number of Voids   


 


    Incontinence  1 


 


    Urinal  1 


 


  Number of Stools   


 


    Incontinence 1 1 


 


    Urinal  1 








 Vital Signs











Temp Pulse Resp BP Pulse Ox


 


 36.4 C   62   16   129/63 H  98 


 


 01/25/17 08:00  01/25/17 08:00  01/25/17 08:00  01/25/17 08:00  01/25/17 08:00








 Laboratory Results





 01/25/17 04:30 





 01/25/17 04:30 











ICD10 Worksheet


Patient Problems: 


 Problems











Problem Status Diagnosed


 


Closed compression fracture of L1 lumbar vertebral body Acute 


 


Compression fracture of L3 lumbar vertebra Acute 


 


Compression fracture of L4 lumbar vertebra Acute 


 


Cellulitis of left foot Acute 


 


MRSA (methicillin resistant Staphylococcus aureus) Acute 12/30/16

## 2017-01-26 LAB
ANION GAP SERPL CALC-SCNC: 7 MEQ/L (ref 8–16)
CALCIUM SERPL-MCNC: 8.6 MG/DL (ref 8.5–10.4)
CHLORIDE SERPL-SCNC: 107 MEQ/L (ref 97–110)
CO2 SERPL-SCNC: 26 MEQ/L (ref 22–31)
CREAT SERPL-MCNC: 0.9 MG/DL (ref 0.7–1.3)
GFR SERPL CREATININE-BSD FRML MDRD: > 60 ML/MIN/{1.73_M2}
GLUCOSE SERPL-MCNC: 86 MG/DL (ref 70–100)
POTASSIUM SERPL-SCNC: 4.4 MEQ/L (ref 3.5–5.2)
SODIUM SERPL-SCNC: 140 MEQ/L (ref 134–144)

## 2017-01-26 PROCEDURE — 0QS00ZZ REPOSITION LUMBAR VERTEBRA, OPEN APPROACH: ICD-10-PCS | Performed by: NEUROLOGICAL SURGERY

## 2017-01-26 PROCEDURE — 00NY0ZZ RELEASE LUMBAR SPINAL CORD, OPEN APPROACH: ICD-10-PCS | Performed by: NEUROLOGICAL SURGERY

## 2017-01-26 PROCEDURE — 0QU00JZ SUPPLEMENT LUMBAR VERTEBRA WITH SYNTHETIC SUBSTITUTE, OPEN APPROACH: ICD-10-PCS | Performed by: NEUROLOGICAL SURGERY

## 2017-01-26 RX ADMIN — INSULIN LISPRO SCH: 100 INJECTION, SOLUTION INTRAVENOUS; SUBCUTANEOUS at 08:49

## 2017-01-26 RX ADMIN — HYDROCODONE BITARTRATE AND ACETAMINOPHEN PRN TAB: 5; 325 TABLET ORAL at 20:19

## 2017-01-26 RX ADMIN — ERTAPENEM SODIUM SCH MLS: 1 INJECTION, POWDER, LYOPHILIZED, FOR SOLUTION INTRAMUSCULAR; INTRAVENOUS at 20:19

## 2017-01-26 RX ADMIN — IPRATROPIUM BROMIDE AND ALBUTEROL SULFATE SCH: .5; 3 SOLUTION RESPIRATORY (INHALATION) at 16:11

## 2017-01-26 RX ADMIN — INSULIN LISPRO SCH: 100 INJECTION, SOLUTION INTRAVENOUS; SUBCUTANEOUS at 20:30

## 2017-01-26 RX ADMIN — FAMOTIDINE SCH MG: 20 TABLET, FILM COATED ORAL at 20:18

## 2017-01-26 RX ADMIN — ALLOPURINOL SCH: 100 TABLET ORAL at 08:44

## 2017-01-26 RX ADMIN — IPRATROPIUM BROMIDE AND ALBUTEROL SULFATE SCH ML: .5; 3 SOLUTION RESPIRATORY (INHALATION) at 21:38

## 2017-01-26 RX ADMIN — IRON SUPPLEMENT SCH: 325 TABLET ORAL at 08:45

## 2017-01-26 RX ADMIN — Medication SCH MLS: at 08:44

## 2017-01-26 RX ADMIN — Medication SCH UNITS: at 20:18

## 2017-01-26 RX ADMIN — DOCUSATE SODIUM AND SENNOSIDES SCH: 50; 8.6 TABLET ORAL at 08:49

## 2017-01-26 RX ADMIN — DOCUSATE SODIUM AND SENNOSIDES SCH TAB: 50; 8.6 TABLET ORAL at 21:58

## 2017-01-26 RX ADMIN — FLUTICASONE PROPIONATE AND SALMETEROL SCH: 50; 500 POWDER RESPIRATORY (INHALATION) at 10:59

## 2017-01-26 RX ADMIN — HYDROCORTISONE SODIUM SUCCINATE SCH MG: 100 INJECTION, POWDER, FOR SOLUTION INTRAMUSCULAR; INTRAVENOUS at 20:19

## 2017-01-26 RX ADMIN — FUROSEMIDE SCH: 40 TABLET ORAL at 08:49

## 2017-01-26 RX ADMIN — IPRATROPIUM BROMIDE AND ALBUTEROL SULFATE SCH ML: .5; 3 SOLUTION RESPIRATORY (INHALATION) at 05:44

## 2017-01-26 RX ADMIN — OXYCODONE HYDROCHLORIDE AND ACETAMINOPHEN PRN TAB: 5; 325 TABLET ORAL at 23:29

## 2017-01-26 RX ADMIN — LOSARTAN POTASSIUM SCH MG: 50 TABLET, FILM COATED ORAL at 21:58

## 2017-01-26 RX ADMIN — FAMOTIDINE SCH: 20 TABLET, FILM COATED ORAL at 08:48

## 2017-01-26 RX ADMIN — FLUTICASONE PROPIONATE AND SALMETEROL SCH: 50; 500 POWDER RESPIRATORY (INHALATION) at 21:38

## 2017-01-26 RX ADMIN — INSULIN LISPRO SCH: 100 INJECTION, SOLUTION INTRAVENOUS; SUBCUTANEOUS at 12:05

## 2017-01-26 RX ADMIN — ATORVASTATIN CALCIUM SCH MG: 20 TABLET, FILM COATED ORAL at 20:18

## 2017-01-26 RX ADMIN — Medication SCH MLS: at 08:42

## 2017-01-26 RX ADMIN — SODIUM CHLORIDE SCH MLS: 900 INJECTION, SOLUTION INTRAVENOUS at 20:19

## 2017-01-26 RX ADMIN — SODIUM CHLORIDE SCH MLS: 900 INJECTION, SOLUTION INTRAVENOUS at 08:42

## 2017-01-26 RX ADMIN — PANTOPRAZOLE SODIUM SCH: 40 TABLET, DELAYED RELEASE ORAL at 08:49

## 2017-01-26 RX ADMIN — MONTELUKAST SODIUM SCH MG: 10 TABLET, COATED ORAL at 21:59

## 2017-01-26 RX ADMIN — IPRATROPIUM BROMIDE AND ALBUTEROL SULFATE SCH ML: .5; 3 SOLUTION RESPIRATORY (INHALATION) at 10:59

## 2017-01-26 NOTE — PCMIDPN
Assessment/Plan: 


Assessment:


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  Patient is on both vancomycin and 

ertapenem.  He is stable on these medications.  Plan to continue these for 6 

weeks total.  Duration starts from date of prior surgery which was 12/30/2016.  

This makes an date 2/9/2017.  In the meantime the patient had a fall at home 

and acute onset right lower extremity pain and weakness.  Determination that he 

had multiple lumbar levels of compression fracture as well as acute severe 

spinal stenosis.  He went for decompression today with Neurosurgery.  No change 

in plans of coverage with antibiotics.











Plan:


1. Continue both vancomycin and ertapenem.


2. Follow clinical course.


3. Wound dressing changes at least Q 48 hours.





Subjective: 


Patient is awaiting transportation to surgery.  No new complaints.  No fevers 

or chills.  No rash.


Objective: 


Vancomycin # 1


Ertapenem # 2 Vital Signs











Temp Pulse Resp BP Pulse Ox


 


 36.9 C   74   16   146/67 H  95 


 


 01/26/17 11:24  01/26/17 11:24  01/26/17 11:24  01/26/17 11:24  01/26/17 11:24








 Laboratory Results





 01/25/17 04:30 





 01/26/17 05:20 





 











 01/25/17 01/26/17 01/27/17





 05:59 05:59 05:59


 


Intake Total 1275 2065 250


 


Output Total 1400 500 


 


Balance -125 1565 250














- Physical Exam


General Appearance: WD/WN, alert, no apparent distress, non-toxic


Respiratory: lungs clear, normal breath sounds, No respiratory distress


Cardiac/Chest: regular rate, rhythm, No tachycardia


Skin: normal color, warm/dry, No rash


Neuro/Psych: alert, normal mood/affect, oriented x 3





ICD10 Worksheet


Patient Problems: 


 Problems











Problem Status Diagnosed


 


Closed compression fracture of L1 lumbar vertebral body Acute 


 


Compression fracture of L3 lumbar vertebra Acute 


 


Compression fracture of L4 lumbar vertebra Acute 


 


Cellulitis of left foot Acute 


 


MRSA (methicillin resistant Staphylococcus aureus) Acute 12/30/16

## 2017-01-26 NOTE — HOSPPROG
Hospitalist Progress Note


Assessment/Plan: 


Patient is a 71-year-old male presented to the emergency room with worsening 

low back pain with right lower extremity weakness.  Today is my 1st encounter 

with the patient.  Chart reviewed.  Discussed his care with Dr. Sykes.





#. Lumbar back pain  after sustaining a fall


*   To get a L3-L4 laminectomy today





#. chronic hypoxemic respiratory failure


*  at his baseline on 4 L


*  chest x-ray shows some concern for possible pneumonia.  Patient is not 

febrile and does not have any signs or symptoms of pneumonia.





#.  anemia


*  hemoglobin hematocrit are lower than his baseline


*  will follow





#.  chronic COPD / no exacerbation


*  on chronic steroids / low-dose prednisone


*  was given 100 mg of hydrocortisone before induction of anesthesia


*  will give him 2 more doses every 8 hours and then he can resume his oral 

prednisone





#. Prediabetes


* hold metformin. SSI


*  had some hypoglycemia earlier today which required an amp of D50





#.  gait instability


*  will have PT and OT see him after surgery





#.  left diabetic foot ulcer


*  on vancomycin and ertapenem


*  wound Care following / will have dressing changes every other day





#. HTN


* cont home meds


*  stable





#. Gout


* allopurinol





#Atrial fibrillation/ history of this


*  during my evaluation he is in sinus rhythm


* Restart ASA after surgery when ok by NSGY





#Diet: DM, NPO for surgery





#DVT ppx: SCDs/ will need Lovenox when okay with surgical team








Subjective: patient is complaining of some back pain


Objective: 


 Vital Signs











Temp Pulse Resp BP Pulse Ox


 


 36.9 C   74   16   146/67 H  95 


 


 01/26/17 11:24  01/26/17 11:24  01/26/17 11:24  01/26/17 11:24  01/26/17 11:24








 Laboratory Results





 01/25/17 04:30 





 01/26/17 05:20 





 











 01/25/17 01/26/17 01/27/17





 05:59 05:59 05:59


 


Intake Total 1275 2065 250


 


Output Total 1400 500 


 


Balance -125 1565 250








 











PT  14.4 SEC (12.0-15.0)   01/24/17  04:50    


 


INR  1.13  (0.83-1.16)   01/24/17  04:50    














- Physical Exam


Constitutional: chronically ill appearing, uncomfortable


Eyes: PERRL


Ears, Nose, Mouth, Throat: hard of hearing


Cardiovascular: regular rate and rhythym


Respiratory: no respiratory distress, reduced air movement ( very diminished 

bibasilar)


Gastrointestinal: normoactive bowel sounds


Skin: warm, other ( do not evaluated his lower extremities.  He was leaving for 

surgery)


Neurologic: AAOx3


Psychiatric: interacting appropriately, not anxious





ICD10 Worksheet


Patient Problems: 


 Problems











Problem Status Diagnosed


 


Closed compression fracture of L1 lumbar vertebral body Acute 


 


Compression fracture of L3 lumbar vertebra Acute 


 


Compression fracture of L4 lumbar vertebra Acute 


 


Cellulitis of left foot Acute 


 


MRSA (methicillin resistant Staphylococcus aureus) Acute 12/30/16

## 2017-01-26 NOTE — NEUSURGPN
Assessment/Plan: 


72 y/o male with lumbar stenosis at L1 and L4 and lumbar compression fractures (

L4 and L5). Right leg pain.








-Scheduled  for L3-L5 laminectomy with L4 and L5 kyphoplasty today.


-Consents signed in chartfor L3-5 laminectomy and L4 and L5 kyphoplasty.


-NPO


-Marked


- Appreciate medicine clearance for surgery


-Optimize pain management


-Please notify NS with any change in neuro/motor exam


D/w Dr Basilio


Subjective: 


Pt resting in bed, wishes to proceed with surgery.


Objective: 


AAOx3


NAD


VSS


MAEx4


Motor 5/5 BLE


L foot in padding/float


+LT


Urinary Catheter in Place: No





- Physician


Discussed Patient with : Praful





Neurosurgery Physical Exam





- Vitals, I&O, Labs





 I and O











 01/25/17 01/26/17 01/27/17





 05:59 05:59 05:59


 


Intake Total 1275 2065 


 


Output Total 1400 500 


 


Balance -125 1565 


 


Intake:   


 


  Oral (ml) 1175 1150 


 


  IV Infused (ml) 100 915 


 


    Ertapenem 1 gm In Ns 100 100 100 





    ml @ 200 mls/hr IV   





    DAILY21 LAVERNE Rx#:   





    [id number]   


 


    Vancomycin 1 gm In Ns 250  815 





    ml @ 250 mls/hr IV Q24H   





    LAVERNE Rx#:[id number]   


 


Output:   


 


  Urine (ml) 1400 500 


 


    Incontinence 300  


 


    Urinal 1100 500 


 


Other:   


 


  Number of Voids   


 


    Incontinence 1  


 


    Urinal 1 3 


 


  Number of Stools   


 


    Incontinence 1  


 


    Urinal 1  








 Vital Signs











Temp Pulse Resp BP Pulse Ox


 


 36.6 C   66   15   133/76 H  96 


 


 01/26/17 07:35  01/26/17 07:35  01/26/17 07:35  01/26/17 07:35  01/26/17 07:35








 Laboratory Results





 01/25/17 04:30 





 01/26/17 05:20 











ICD10 Worksheet


Patient Problems: 


 Problems











Problem Status Diagnosed


 


Closed compression fracture of L1 lumbar vertebral body Acute 


 


Compression fracture of L3 lumbar vertebra Acute 


 


Compression fracture of L4 lumbar vertebra Acute 


 


Cellulitis of left foot Acute 


 


MRSA (methicillin resistant Staphylococcus aureus) Acute 12/30/16

## 2017-01-26 NOTE — GOP
[f 
rep st]



                                                                OPERATIVE REPORT





DATE OF OPERATION:  01/26/2017



SURGEON:  Dale Basilio MD



ASSISTANT:  [place] English.



ANESTHESIA:  General.



PREOPERATIVE DIAGNOSIS:  

1.  Pathologic compression fracture, L4 and L5.

2.  Severe spinal stenosis, L3 through L5 with lower extremity claudication and 
lower extremity weakness.

3.  Treatment refractory to nonoperative intervention.



POSTOPERATIVE DIAGNOSIS:  

1.  Pathologic compression fracture, L4 and L5.

2.  Severe spinal stenosis, L3 through L5 with lower extremity claudication and 
lower extremity weakness.

3.  Treatment refractory to nonoperative intervention.



PROCEDURE PERFORMED:  

1.  Right-sided extrapedicular L4 and L5 kyphoplasties from the [employer] 
Kyphon system with approximately 2.5 mm of cement into the L4 vertebral body 
and 5 mm of cement into the L5 vertebral body.

2.  Decompressive laminectomy with bilateral medial facetectomies at L4-L5 and 
decompressive laminectomy with bilateral medial facetectomies and synovial cyst 
resection, L3-L4.

3.  Use of intraoperative 3D Stealth navigation.

4.  Use of intraoperative fluoroscopy, less 1 hour physician time.  

5.  Use of neuromonitoring.



FINDINGS:  per imaging



SPECIMENS:  None.



ESTIMATED BLOOD LOSS:  80 mL.



INDICATIONS:  Mr. Jurado is a 71-year-old gentleman who unfortunately has 
multiple medical comorbidities, including utilization of steroids for his COPD.
  The patient has developed previous compression fractures in the past and had 
worsening back pain with lower extremity weakness and radiculopathy.  Imaging 
demonstrated that he had a pathologic compression fracture of the L4 vertebral 
body as well as the superior endplate of L5 as well as severe spinal stenosis 
from L3 through L5 with an associated synovial cyst on the right side at the L3-
4 level.  After discussion of the risks, benefits, and treatment alternatives 
and after failing nonoperative interventions, we decided to proceed forth with 
surgery as described above.



DESCRIPTION OF PROCEDURE:  Patient was brought to the operating theater and 
underwent general endotracheal anesthesia without complications.  CHACORTA Escobare in place.  He was flipped prone onto the Chuck table and all bony 
processes were inspected and padded. During the positioning, he developed a 
superficial tear of his left forearm skin which is consistent with his 
utilization of steroids and very friable tissues.  The lower lumbar region was 
then prepped and draped in usual sterile surgical fashion.  A time-out was 
completed per protocol.  The patient received antibiotics within 1 hour of 
incision. 



Using lateral fluoroscopy and a spinal needle, we then picked our entry point 
to the L3 through L5 levels.  This was marked in the midline and incorporated 
his prior lumbar incision where he had a prior right-sided L3-4 decompression.  
The incision was infiltrated with Marcaine with epinephrine.  The incision was 
taken down with the scalpel blade, then using the monopolar taken down the 
midline through the lumbodorsal fascia, and subperiosteal dissection carried to 
the medial facet joints of L3-L4, L4-5.  Deep retractors were placed to 
maintain our exposure, and we confirmed our level using lateral fluoroscopy.  
We attached the 3D Stealth navigation clamp down the spinous process of L5 and 
completed a 3D Stealth navigation spin.  Using 3D Stealth navigation and the 
Mikhail needle, we cannulated the L4 and L5 vertebral bodies independently from an 
extrapedicular manner on the right side.  The L4 anterior body was noted to be 
completely flattened and it was very difficult to cannulate.  However, we 
placed a K-wire into each of the L4 and L5 levels into the fracture sites.  We 
then placed balloons under live AP and lateral fluoroscopic images which we 
then inflated to create a cavity at the levels of the fractures both at L4 and 
L5.  We then withdrew the balloons and placed the cement delivery systems 
independently into the L4 and L5 vertebral bodies in the cavities that we 
created in the pathologic fractures.  At this point, using live fluoroscopic 
images at AP and lateral images, we had injected approximately 2.5 mm of cement 
into the L4 vertebral body as well as 5 mm of cement into the L5 vertebral body 
until we had evidence of some posterior migration of the spine.  There was no 
evidence of any vascular uptake or extravasation of cement outside the 
vertebral bodies.  Once we felt that we had good cement filling of the fractures
, we withdrew the cement delivery systems.  



At this point, using a combination of the bur tip on the drill bit, Kerrison 
punches and Leksell rongeurs, we completed a decompressive laminectomy at L3-L4
, and L4-L5.  We resected the synovial cyst on the right side at the L3-4 
level.  Once we felt that everything was well decompressed on manual palpation, 
we obtained hemostasis with the bipolar.  The wound was irrigated copiously 
with bacitracin irrigation, and the drain left in the subfascial space.  The 
wound was then closed in multiple layers using Vicryl sutures for the deep 
layers and Dermabond for the skin.  The patient's wounds were dressed 
sterilely.  He was then flipped supine onto the transfer cart, where he was 
awakened, extubated, taken to recovery room in stable condition. 



There were no complications and no noted changes on neuromonitoring throughout 
the procedure.





Job #:  [id number]

MTDD

## 2017-01-26 NOTE — POSTOPPROG
Post Op Note


Date of Operation: 01/26/17


Surgeon: Dale Basilio


Assistant: ROHIT English PAC


Anesthesia: GET(General Endotracheal)


Pre-op Diagnosis: compression fx, lumbar stenosis


Post-op Diagnosis: compression fx, lumbar stenosis


Indication: compression fx, lumbar stenosis


Procedure: L3-L5 lmainectomy, L4 and L5 kyphoplasty


Inf/Abcess present in the surg proc area at time of surgery?: No


EBL: 80


Drains: Chuck HERNANDEZ Addendum





- Addendum


.: 


 S: low back pain





O: NAD A&Ox3 MAEx 4 Dressing c/d/i





Plan:





-Advance diet as tolerated


-PTOT


-Optimize pain management


-RANDY x1


-DVT prophx: TEDs, SCDs, Lovenox okay to restart tomorrow evening


-Please notify SN with any change in neuro/motor exam

## 2017-01-27 LAB
% IMMATURE GRANULYOCYTES: 0.5 % (ref 0–1.1)
ABSOLUTE IMMATURE GRANULOCYTES: 0.03 10^3/UL (ref 0–0.1)
ABSOLUTE NRBC COUNT: 0 10^3/UL (ref 0–0.01)
ADD DIFF?: NO
ADD MORPH?: NO
ADD SCAN?: NO
ANION GAP SERPL CALC-SCNC: 6 MEQ/L (ref 8–16)
ATYPICAL LYMPHOCYTE FLAG: 0 (ref 0–99)
CALCIUM SERPL-MCNC: 8.4 MG/DL (ref 8.5–10.4)
CHLORIDE SERPL-SCNC: 108 MEQ/L (ref 97–110)
CO2 SERPL-SCNC: 25 MEQ/L (ref 22–31)
CREAT SERPL-MCNC: 0.8 MG/DL (ref 0.7–1.3)
ERYTHROCYTE [DISTWIDTH] IN BLOOD BY AUTOMATED COUNT: 14.6 % (ref 11.5–15.2)
FRAGMENT RBC FLAG: 0 (ref 0–99)
GFR SERPL CREATININE-BSD FRML MDRD: > 60 ML/MIN/{1.73_M2}
GLUCOSE SERPL-MCNC: 129 MG/DL (ref 70–100)
HCT VFR BLD CALC: 25.8 % (ref 40–51)
HGB BLD-MCNC: 8.7 G/DL (ref 13.7–17.5)
LEFT SHIFT FLG: 0 (ref 0–99)
LIPEMIA HEMOLYSIS FLAG: 80 (ref 0–99)
MCH RBC BLDCO QN: 32 PG (ref 27.9–34.1)
MCHC RBC AUTO-ENTMCNC: 33.7 G/DL (ref 32.4–36.7)
MCV RBC AUTO: 94.9 FL (ref 81.5–99.8)
NRBC-AUTO%: 0 % (ref 0–0.2)
PLATELET # BLD: 223 10^3/UL (ref 150–400)
PLATELET CLUMPS FLAG: 0 (ref 0–99)
PMV BLD AUTO: 8.1 FL (ref 8.7–11.7)
POTASSIUM SERPL-SCNC: 5 MEQ/L (ref 3.5–5.2)
RBC # BLD AUTO: 2.72 10^6/UL (ref 4.4–6.38)
SODIUM SERPL-SCNC: 139 MEQ/L (ref 134–144)

## 2017-01-27 PROCEDURE — 2W1TX6Z COMPRESSION OF LEFT FOOT USING PRESSURE DRESSING: ICD-10-PCS | Performed by: INTERNAL MEDICINE

## 2017-01-27 RX ADMIN — IPRATROPIUM BROMIDE AND ALBUTEROL SULFATE SCH ML: .5; 3 SOLUTION RESPIRATORY (INHALATION) at 20:30

## 2017-01-27 RX ADMIN — ERTAPENEM SODIUM SCH MLS: 1 INJECTION, POWDER, LYOPHILIZED, FOR SOLUTION INTRAMUSCULAR; INTRAVENOUS at 21:59

## 2017-01-27 RX ADMIN — FLUTICASONE PROPIONATE AND SALMETEROL SCH PUFFS: 50; 500 POWDER RESPIRATORY (INHALATION) at 20:32

## 2017-01-27 RX ADMIN — IPRATROPIUM BROMIDE AND ALBUTEROL SULFATE SCH ML: .5; 3 SOLUTION RESPIRATORY (INHALATION) at 11:53

## 2017-01-27 RX ADMIN — OXYCODONE HYDROCHLORIDE AND ACETAMINOPHEN PRN TAB: 5; 325 TABLET ORAL at 21:58

## 2017-01-27 RX ADMIN — OXYCODONE HYDROCHLORIDE AND ACETAMINOPHEN PRN TAB: 5; 325 TABLET ORAL at 08:14

## 2017-01-27 RX ADMIN — MONTELUKAST SODIUM SCH MG: 10 TABLET, COATED ORAL at 18:14

## 2017-01-27 RX ADMIN — FUROSEMIDE SCH: 40 TABLET ORAL at 08:17

## 2017-01-27 RX ADMIN — ATORVASTATIN CALCIUM SCH MG: 20 TABLET, FILM COATED ORAL at 21:58

## 2017-01-27 RX ADMIN — DOCUSATE SODIUM AND SENNOSIDES SCH TAB: 50; 8.6 TABLET ORAL at 08:15

## 2017-01-27 RX ADMIN — INSULIN LISPRO SCH UNITS: 100 INJECTION, SOLUTION INTRAVENOUS; SUBCUTANEOUS at 11:55

## 2017-01-27 RX ADMIN — HYDROCORTISONE SODIUM SUCCINATE SCH MG: 100 INJECTION, POWDER, FOR SOLUTION INTRAMUSCULAR; INTRAVENOUS at 05:00

## 2017-01-27 RX ADMIN — Medication SCH UNITS: at 21:57

## 2017-01-27 RX ADMIN — Medication SCH MLS: at 13:15

## 2017-01-27 RX ADMIN — LOSARTAN POTASSIUM SCH MG: 50 TABLET, FILM COATED ORAL at 21:58

## 2017-01-27 RX ADMIN — PANTOPRAZOLE SODIUM SCH MG: 40 TABLET, DELAYED RELEASE ORAL at 08:17

## 2017-01-27 RX ADMIN — OXYCODONE HYDROCHLORIDE AND ACETAMINOPHEN PRN TAB: 5; 325 TABLET ORAL at 11:54

## 2017-01-27 RX ADMIN — FAMOTIDINE SCH MG: 20 TABLET, FILM COATED ORAL at 21:58

## 2017-01-27 RX ADMIN — FLUTICASONE PROPIONATE AND SALMETEROL SCH PUFFS: 50; 500 POWDER RESPIRATORY (INHALATION) at 09:38

## 2017-01-27 RX ADMIN — IPRATROPIUM BROMIDE AND ALBUTEROL SULFATE SCH ML: .5; 3 SOLUTION RESPIRATORY (INHALATION) at 16:13

## 2017-01-27 RX ADMIN — INSULIN LISPRO SCH: 100 INJECTION, SOLUTION INTRAVENOUS; SUBCUTANEOUS at 08:13

## 2017-01-27 RX ADMIN — ALLOPURINOL SCH MG: 100 TABLET ORAL at 08:17

## 2017-01-27 RX ADMIN — INSULIN LISPRO SCH UNITS: 100 INJECTION, SOLUTION INTRAVENOUS; SUBCUTANEOUS at 18:14

## 2017-01-27 RX ADMIN — IRON SUPPLEMENT SCH MG: 325 TABLET ORAL at 08:17

## 2017-01-27 RX ADMIN — FAMOTIDINE SCH MG: 20 TABLET, FILM COATED ORAL at 08:17

## 2017-01-27 RX ADMIN — DOCUSATE SODIUM AND SENNOSIDES SCH TAB: 50; 8.6 TABLET ORAL at 21:57

## 2017-01-27 RX ADMIN — IPRATROPIUM BROMIDE AND ALBUTEROL SULFATE SCH ML: .5; 3 SOLUTION RESPIRATORY (INHALATION) at 06:06

## 2017-01-27 NOTE — NEUSURGPN
Assessment/Plan: 


72 y/o male with lumbar stenosis and lumbar compression fractures (L4 and L5). 

Now POD #1 L3-L5 laminectomy, L4 and L5 kyphoplasty





-Advance diet as tolerated


-PTOT


-Optimize pain management


-RANDY x1 continue today


-SIlver dressing: continue for 7 days post op adn then change tp dressing daily 

dressing changes. 


-DVT prophx: TEDs, SCDs, Lovenox/hep Sq okay to restart this evening


-Please notify SN with any change in neuro/motor exam


Subjective: 


back pain improved. 


Objective: 


NAD A&Ox3 MAEx4, 5/5 and equal in BUE and BLE. RANDY drain serosanguineous





- Physician


Discussed Patient with : Praful





Neurosurgery Physical Exam





- Vitals, I&O, Labs





 I and O











 01/26/17 01/27/17 01/28/17





 05:59 05:59 05:59


 


Intake Total 2065 1450 


 


Output Total 500 195 200


 


Balance 1565 1255 -200


 


Intake:   


 


  Oral (ml) 1150 200 


 


  IV Intake (ml)  1000 


 


  IV Infused (ml) 915 250 


 


    Ertapenem 1 gm In Ns 100 100  





    ml @ 200 mls/hr IV   





    DAILY21 [facility] Rx#:   





    [id number]   


 


    Vancomycin 1 gm In Ns 250 815  





    ml @ 250 mls/hr IV Q24H   





    LA[facility]NE Rx#:[id number]   


 


    Vancomycin HCl/Normal  250 





    Saline 250 ml @ 250 mls/   





    hr IV Q24H [facility] Rx#:   





    [id number]   


 


Output:   


 


  Urine (ml) 500  200


 


    Urinal 500  200


 


  Estimated Blood Loss (ml)  80 


 


  Wound Drainage (ml)  115 


 


    Back Chuck Chavez  115 


 


Other:   


 


  Intake Quantity  Yes 





  Sufficient   


 


  Number of Voids   


 


    Urinal 3  1


 


    Bedside Commode  1 


 


  Number of Stools   


 


    Bedside Commode  1 








 Vital Signs











Temp Pulse Resp BP Pulse Ox


 


 35.5 C L  42 L  10 L  135/70 H  94 


 


 01/27/17 07:22  01/27/17 07:22  01/27/17 07:22  01/27/17 07:22  01/27/17 07:22








 Laboratory Results





 01/27/17 05:40 





 01/27/17 05:40 











ICD10 Worksheet


Patient Problems: 


 Problems











Problem Status Diagnosed


 


Closed compression fracture of L1 lumbar vertebral body Acute 


 


Compression fracture of L3 lumbar vertebra Acute 


 


Compression fracture of L4 lumbar vertebra Acute 


 


Cellulitis of left foot Acute 


 


MRSA (methicillin resistant Staphylococcus aureus) Acute 12/30/16

## 2017-01-27 NOTE — HOSPPROG
Hospitalist Progress Note


Assessment/Plan: 


Patient is a 71-year-old male presented to the emergency room with worsening 

low back pain with right lower extremity weakness.  





#. Lumbar back pain  after sustaining a fall


*   S/p L3-L4 decompressive laminectomy, L4 & L5 kyphoplasty





#. chronic hypoxemic respiratory failure


*  at his baseline on 4 L


*  chest x-ray shows some concern for possible pneumonia.  Patient is not 

febrile and does not have any signs or symptoms of pneumonia.





#.  anemia


*  hemoglobin hematocrit are stable





#.  chronic COPD / no exacerbation


*  on chronic steroids / low-dose prednisone





#. Prediabetes


* hold metformin. SSI





#.  gait instability


*  will have PT and OT see him after surgery





#.  left diabetic foot ulcer/osteomyelitis


*  on vancomycin and ertapenem


*  wound Care following / will have dressing changes every other day





#. HTN


* cont home meds


*  stable





#. bradycardia





#. Gout


* allopurinol





#Atrial fibrillation/ history of this


* he is in sinus rhythm


* Restart ASA after surgery when ok by NSGY





#Diet: DM,





#DVT ppx: SCDs/ will need Lovenox when okay with surgical team








Subjective: Yobani is eating breakfast/ appetite is good/ no c/o pain.


Objective: 


 Vital Signs











Temp Pulse Resp BP Pulse Ox


 


 35.5 C L  42 L  10 L  135/70 H  94 


 


 01/27/17 07:22  01/27/17 07:22  01/27/17 07:22  01/27/17 07:22  01/27/17 07:22








 Laboratory Results





 01/27/17 05:40 





 01/27/17 05:40 





 











 01/26/17 01/27/17 01/28/17





 05:59 05:59 05:59


 


Intake Total 2065 1450 


 


Output Total 500 195 200


 


Balance 1565 1255 -200








 











PT  14.4 SEC (12.0-15.0)   01/24/17  04:50    


 


INR  1.13  (0.83-1.16)   01/24/17  04:50    














- Physical Exam


Constitutional: not in pain, chronically ill appearing


Eyes: PERRL


Ears, Nose, Mouth, Throat: hearing normal


Respiratory: no respiratory distress


Gastrointestinal: normoactive bowel sounds


Skin: warm, other (left foot with dressing in place)


Musculoskeletal: generalized weakness


Neurologic: AAOx3


Psychiatric: interacting appropriately, not anxious





ICD10 Worksheet


Patient Problems: 


 Problems











Problem Status Diagnosed


 


Closed compression fracture of L1 lumbar vertebral body Acute 


 


Compression fracture of L3 lumbar vertebra Acute 


 


Compression fracture of L4 lumbar vertebra Acute 


 


Cellulitis of left foot Acute 


 


MRSA (methicillin resistant Staphylococcus aureus) Acute 12/30/16

## 2017-01-27 NOTE — WOCRNPDOC
[facility] Advanced Assessment Note





- Skin Integrity Problem, Advanced Assess


  ** Forehead Pressure Injury


Dressing Type: Open to Air


Kailee Wound Tissue: Blanching (blanching erythema approx 1x7x0 cm across 

forehead. ), Erythema, Non-blanching (directly surrounding open wound)


Site Measurement - Head-to-Toe Length X Width X Depth (cm): 0.2x0.3x0.1


Pressure Injury Stage: Stage 2, Medical Device Related Pressure Injury


Pressure Injury Present on Admit: No


Skin Integrity Problem Comment: May be from positioning during back surgery.





  ** Right Lower Arm Skin Tears


Dressing Type: Tegaderm Film, Telfa


Dressing Description: Intact, Saturated


Exudate Amount: Moderate


Exudate Characteristic(s): Serosanguinous


Integumentary Issue Intervention: Dressing Changed


Kailee Wound Tissue: Ecchymotic, Erythema


Kailee Wound Swelling: Mild


Wound Bed Constitution: Smooth Tissue


Site Measurement - Head-to-Toe Length X Width X Depth (cm): Proximal: 

0.3x1.5x0.1, distal 0.5x3x0.2


Skin Integrity Problem Comment: Proximal skin tear: Category 2a. Partial flap 

was realigned on assessment. Did not apply steri strips due to extreemly 

fragile skin.  Distal skin tear category 2b.  Applied wound gel to open areas, 

covered with adaptic touch and them mepilex.  Wrapped with esther. Rn Raquel in 

room.  Per pts wife injury occured in surgery and was dressed post operatively 

yesterday.





  ** Left Heel Pressure Injury


Dressing Type: Allevyn Life


Dressing Description: Clean/Dry, Intact


Integumentary Issue Intervention: Dressing Removed


Kailee Wound Tissue: Macerated (moderate-severe)


Kailee Wound Swelling: None


Wound Bed Color: Purple, Red


Wound Bed Constitution: Granulation Tissue


Wound Edges: Epithelizing, Attached


Site Measurement - Head-to-Toe Length X Width X Depth (cm): 0.6x1.7x0.2


Pressure Injury Present on Admit: Yes


Skin Integrity Problem Comment: Dressing orders will be changed as wound is too 

macerated.  Patient's wife expressed concern over the wound. There is a dark 

purple area in wound and perhaps kailee wound (difficult to assess fully due to 

extensive maceration) that may be a new deep tissue injury within the existing 

wound.  It is located around 3 oclock.  Will watch area closely.  Please 

offload bilateral heels at all times.  Patient had own offloading boot made of 

foam he was using from home on assessment, and other heel was loaded on the 

mattress.  Asked RN to obtain and place bilateral heel boots.





  ** Left First Metatarsal Head Surgical Wound/Incision


Dressing Type: Allevyn Life, Silver Packing


Dressing Description: Clean/Dry, Intact


Exudate Amount: None


Integumentary Issue Intervention: Dressing Changed


Kailee Wound Swelling: None


Wound Bed Color: Red, Yellow


Wound Bed Constitution: Granulation Tissue (50%), Adhered Slough (50%)


Site Measurement - Head-to-Toe Length X Width X Depth (cm): 3.5x1.6x1.2


Skin Integrity Problem Comment: Patient and wife both quite concerned about the 

size of the machine and it impacting patient's mobility.  Discussed at length 

vac use, placement, indication, dressing change freqency,and how to move around 

with the vac.  They consented to trialing it.  Wound flushed with ns.  Skin 

prep kailee wound and drape to dorsal foot. 1 piece of small foam in wound bed.  

Vac set at -125mm Hg continous suction with no leaks.

## 2017-01-27 NOTE — SOAPPROG
SOAP Progress Note


Assessment/Plan: 


Assessment:


71 MALE WITH OSTEO LEFT FOOT SP SAUCERIZATION


WOUND CLEAN/ AFEBRILE























Plan:


WOUND VAC





01/27/17 18:47





Objective: 





 Vital Signs











Temp Pulse Resp BP Pulse Ox


 


 36.6 C   91   15   135/95 H  96 


 


 01/27/17 15:27  01/27/17 16:14  01/27/17 16:14  01/27/17 15:27  01/27/17 16:14








 Laboratory Results





 01/27/17 05:40 





 01/27/17 05:40 





 











 01/26/17 01/27/17 01/28/17





 05:59 05:59 05:59


 


Intake Total 2065 1450 


 


Output Total 500 195 440


 


Balance 1565 1255 -440








 











PT  14.4 SEC (12.0-15.0)   01/24/17  04:50    


 


INR  1.13  (0.83-1.16)   01/24/17  04:50    














ICD10 Worksheet


Patient Problems: 


 Problems











Problem Status Diagnosed


 


Closed compression fracture of L1 lumbar vertebral body Acute 


 


Compression fracture of L3 lumbar vertebra Acute 


 


Compression fracture of L4 lumbar vertebra Acute 


 


Cellulitis of left foot Acute 


 


MRSA (methicillin resistant Staphylococcus aureus) Acute 12/30/16

## 2017-01-27 NOTE — PCMIDPN
Assessment/Plan: 


Assessment:


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  Patient is on both vancomycin and 

ertapenem.  He is stable on these medications.  Plan to continue these for 6 

weeks total.  Duration starts from date of prior surgery which was 12/30/2016.  

This makes an date 2/9/2017.  In the meantime the patient had a fall at home 

and acute onset right lower extremity pain and weakness.  Determination that he 

had multiple lumbar levels of compression fracture as well as acute severe 

spinal stenosis.  He went for decompression yesterday to the operating room.  

Today he has much less pain in the right lower extremity and has increased 

strength.  No change in plans of coverage with antibiotics.











Plan:


1. Continue both vancomycin and ertapenem.


2. Follow clinical course.


3. Wound dressing changes at least Q 48 hours.


4. Vancomycin trough prior to next dose.








01/27/17 17:01








01/27/17 17:02





Subjective: 


Patient is resting comfortably in his hospital bed.  He denies any new 

complaint.  States that his right lower extremity is much less painful after 

surgery.  No fevers or chills.


Objective: 


Vancomycin #2


Ertapenem #3 Vital Signs











Temp Pulse Resp BP Pulse Ox


 


 36.6 C   91   15   135/95 H  96 


 


 01/27/17 15:27  01/27/17 16:14  01/27/17 16:14  01/27/17 15:27  01/27/17 16:14








 Laboratory Results





 01/27/17 05:40 





 01/27/17 05:40 





 











 01/26/17 01/27/17 01/28/17





 05:59 05:59 05:59


 


Intake Total 2065 1450 


 


Output Total 500 195 200


 


Balance 1565 1255 -200














- Physical Exam


General Appearance: WD/WN, alert, no apparent distress, non-toxic


Respiratory: lungs clear, normal breath sounds, No respiratory distress


Cardiac/Chest: regular rate, rhythm, No tachycardia


Extremities: non-tender, No normal inspection


Skin: warm/dry, No rash


Neuro/Psych: alert, normal mood/affect, oriented x 3





ICD10 Worksheet


Patient Problems: 


 Problems











Problem Status Diagnosed


 


Closed compression fracture of L1 lumbar vertebral body Acute 


 


Compression fracture of L3 lumbar vertebra Acute 


 


Compression fracture of L4 lumbar vertebra Acute 


 


Cellulitis of left foot Acute 


 


MRSA (methicillin resistant Staphylococcus aureus) Acute 12/30/16

## 2017-01-28 RX ADMIN — NYSTATIN SCH APP: 100000 POWDER TOPICAL at 22:30

## 2017-01-28 RX ADMIN — IPRATROPIUM BROMIDE AND ALBUTEROL SULFATE SCH ML: .5; 3 SOLUTION RESPIRATORY (INHALATION) at 11:57

## 2017-01-28 RX ADMIN — ALLOPURINOL SCH MG: 100 TABLET ORAL at 09:57

## 2017-01-28 RX ADMIN — OXYCODONE HYDROCHLORIDE AND ACETAMINOPHEN PRN TAB: 5; 325 TABLET ORAL at 21:25

## 2017-01-28 RX ADMIN — FAMOTIDINE SCH MG: 20 TABLET, FILM COATED ORAL at 09:57

## 2017-01-28 RX ADMIN — NYSTATIN SCH APP: 100000 POWDER TOPICAL at 17:45

## 2017-01-28 RX ADMIN — ATORVASTATIN CALCIUM SCH MG: 20 TABLET, FILM COATED ORAL at 21:25

## 2017-01-28 RX ADMIN — MONTELUKAST SODIUM SCH MG: 10 TABLET, COATED ORAL at 18:13

## 2017-01-28 RX ADMIN — DOCUSATE SODIUM AND SENNOSIDES SCH TAB: 50; 8.6 TABLET ORAL at 21:26

## 2017-01-28 RX ADMIN — IPRATROPIUM BROMIDE AND ALBUTEROL SULFATE SCH ML: .5; 3 SOLUTION RESPIRATORY (INHALATION) at 17:27

## 2017-01-28 RX ADMIN — ERTAPENEM SODIUM SCH MLS: 1 INJECTION, POWDER, LYOPHILIZED, FOR SOLUTION INTRAMUSCULAR; INTRAVENOUS at 21:26

## 2017-01-28 RX ADMIN — PANTOPRAZOLE SODIUM SCH MG: 40 TABLET, DELAYED RELEASE ORAL at 09:57

## 2017-01-28 RX ADMIN — INSULIN LISPRO SCH: 100 INJECTION, SOLUTION INTRAVENOUS; SUBCUTANEOUS at 07:56

## 2017-01-28 RX ADMIN — IPRATROPIUM BROMIDE AND ALBUTEROL SULFATE SCH ML: .5; 3 SOLUTION RESPIRATORY (INHALATION) at 21:09

## 2017-01-28 RX ADMIN — LOSARTAN POTASSIUM SCH MG: 50 TABLET, FILM COATED ORAL at 21:25

## 2017-01-28 RX ADMIN — Medication SCH MLS: at 09:56

## 2017-01-28 RX ADMIN — Medication SCH UNITS: at 21:26

## 2017-01-28 RX ADMIN — ENOXAPARIN SODIUM SCH MG: 100 INJECTION SUBCUTANEOUS at 09:57

## 2017-01-28 RX ADMIN — INSULIN LISPRO SCH: 100 INJECTION, SOLUTION INTRAVENOUS; SUBCUTANEOUS at 18:46

## 2017-01-28 RX ADMIN — INSULIN LISPRO SCH: 100 INJECTION, SOLUTION INTRAVENOUS; SUBCUTANEOUS at 12:17

## 2017-01-28 RX ADMIN — FUROSEMIDE SCH MG: 40 TABLET ORAL at 09:57

## 2017-01-28 RX ADMIN — IPRATROPIUM BROMIDE AND ALBUTEROL SULFATE SCH: .5; 3 SOLUTION RESPIRATORY (INHALATION) at 05:48

## 2017-01-28 RX ADMIN — OXYCODONE HYDROCHLORIDE AND ACETAMINOPHEN PRN TAB: 5; 325 TABLET ORAL at 09:58

## 2017-01-28 RX ADMIN — OXYCODONE HYDROCHLORIDE AND ACETAMINOPHEN PRN TAB: 5; 325 TABLET ORAL at 18:13

## 2017-01-28 RX ADMIN — FLUTICASONE PROPIONATE AND SALMETEROL SCH PUFFS: 50; 500 POWDER RESPIRATORY (INHALATION) at 09:58

## 2017-01-28 RX ADMIN — IRON SUPPLEMENT SCH MG: 325 TABLET ORAL at 09:57

## 2017-01-28 RX ADMIN — FAMOTIDINE SCH MG: 20 TABLET, FILM COATED ORAL at 21:25

## 2017-01-28 RX ADMIN — DOCUSATE SODIUM AND SENNOSIDES SCH: 50; 8.6 TABLET ORAL at 09:58

## 2017-01-28 RX ADMIN — FLUTICASONE PROPIONATE AND SALMETEROL SCH PUFFS: 50; 500 POWDER RESPIRATORY (INHALATION) at 21:10

## 2017-01-28 NOTE — HOSPPROG
Hospitalist Progress Note


Assessment/Plan: 


Patient is a 71-year-old male presented to the emergency room with worsening 

low back pain with right lower extremity weakness.  





#. Lumbar back pain  after sustaining a fall


*   S/p L3-L4 decompressive laminectomy, L4 & L5 kyphoplasty





#. chronic hypoxemic respiratory failure


*  at his baseline on 4 L


*  chest x-ray shows some concern for possible pneumonia.  Patient is not 

febrile and does not have any signs or symptoms of pneumonia.





#.  anemia


*  hemoglobin /hematocrit are stable





#.  chronic COPD / no exacerbation


*  on chronic steroids / low-dose prednisone





#. Prediabetes


* hold metformin. SSI





#.  gait instability


*    Physical therapy and occupational therapy are working with him





#.  left diabetic foot ulcer/osteomyelitis


* wound VAC in place


*  on vancomycin and ertapenem


*  wound Care following / will have dressing changes every other day





#. HTN


* cont home meds


*   a bit elevated this morning





#. bradycardia


*  asymptomatic





#. Gout


* allopurinol





#Atrial fibrillation/ history of this


* he is in sinus rhythm


* Restart ASA after surgery when ok by NSGY





#DVT ppx:  low-molecular weight heparin





#  plan.   Continue current treatment.  Patient does not want to go to any type 

of skilled nursing facility.  Per his wife they have stairs in their home and 

he will have a wound VAC in place.  He also needs IV antibiotics.  Will await 

Physical therapy's evaluation tomorrow and see how he does.








Subjective: patient has no specific complaints.


Objective: 


 Vital Signs











Temp Pulse Resp BP Pulse Ox


 


 36.8 C   58 L  18   151/78 H  94 


 


 01/28/17 15:25  01/28/17 17:20  01/28/17 17:20  01/28/17 15:25  01/28/17 17:20








 Laboratory Results





 01/27/17 05:40 





 01/27/17 05:40 





 











 01/27/17 01/28/17 01/29/17





 05:59 05:59 05:59


 


Intake Total 1450  


 


Output Total 195 460 


 


Balance 1255 -460 








 











PT  14.4 SEC (12.0-15.0)   01/24/17  04:50    


 


INR  1.13  (0.83-1.16)   01/24/17  04:50    














- Physical Exam


Constitutional: no apparent distress, chronically ill appearing


Eyes: PERRL


Ears, Nose, Mouth, Throat: hearing normal


Cardiovascular: regular rate and rhythym


Respiratory: no respiratory distress, reduced air movement (  Bibasilar)


Gastrointestinal: normoactive bowel sounds


Skin: other ( back incision well approximated no drainage noted.  Left foot has 

a wound VAC in place toes are reddened.  He has multiple small ecchymotic areas 

from being on prednisone)


Musculoskeletal: generalized weakness


Neurologic: AAOx3


Psychiatric: interacting appropriately





ICD10 Worksheet


Patient Problems: 


 Problems











Problem Status Diagnosed


 


Closed compression fracture of L1 lumbar vertebral body Acute 


 


Compression fracture of L3 lumbar vertebra Acute 


 


Compression fracture of L4 lumbar vertebra Acute 


 


Cellulitis of left foot Acute 


 


MRSA (methicillin resistant Staphylococcus aureus) Acute 12/30/16

## 2017-01-28 NOTE — SOAPPROG
SOAP Progress Note


Assessment/Plan: 


Assessment/Plan


- 72yo M s/p saucerization L foot


- VAC successfully placed yesterday, holding suction appropriately. 


- No other concerning areas on my exam today


- Plan to cont BS abx per ID


- No activity precautions from GS standpoint for L foot, defer to NSG for 

activity orders








01/28/17 09:35





Objective: 





 Vital Signs











Temp Pulse Resp BP Pulse Ox


 


 36.4 C   57 L  11 L  142/78 H  98 


 


 01/28/17 07:42  01/28/17 07:42  01/28/17 07:42  01/28/17 07:42  01/28/17 07:42








 Laboratory Results





 01/27/17 05:40 





 01/27/17 05:40 





 











 01/27/17 01/28/17 01/29/17





 05:59 05:59 05:59


 


Intake Total 1450  


 


Output Total 195 460 


 


Balance 1255 -460 








 











PT  14.4 SEC (12.0-15.0)   01/24/17  04:50    


 


INR  1.13  (0.83-1.16)   01/24/17  04:50    














ICD10 Worksheet


Patient Problems: 


 Problems











Problem Status Diagnosed


 


Closed compression fracture of L1 lumbar vertebral body Acute 


 


Compression fracture of L3 lumbar vertebra Acute 


 


Compression fracture of L4 lumbar vertebra Acute 


 


Cellulitis of left foot Acute 


 


MRSA (methicillin resistant Staphylococcus aureus) Acute 12/30/16

## 2017-01-28 NOTE — PCMIDPN
Assessment/Plan: 


1. Left foot osteomyelitis secondary to MRSA status post debridement:


As outlined by my colleague Dr. Sykes, patient will continue both vancomycin 

and ertapenem as is; stop date February 9th.  Recent vancomycin trough is okay.

  Check complete metabolic panel tomorrow on these 2 antibiotics.  (I ordered)























Subjective: 


In good spirits.  Had a wound VAC placed on his left foot yesterday.   does 

have some loose stool, but reports he is only going once daily with no 

abdominal discomfort or cramping.


Objective: 


Vancomycin 1 g IV daily stop date February 9th


Ertapenem 1 g IV daily stop date February 9th


Afebrile


 Laboratory Tests











  01/27/17





  09:00


 


Vancomycin Trough  13.4








 Vital Signs











Temp Pulse Resp BP Pulse Ox


 


 36.4 C   57 L  11 L  142/78 H  98 


 


 01/28/17 07:42  01/28/17 07:42  01/28/17 07:42  01/28/17 07:42  01/28/17 07:42








 Laboratory Results





 01/27/17 05:40 





 01/27/17 05:40 





 











 01/27/17 01/28/17 01/29/17





 05:59 05:59 05:59


 


Intake Total 1450  


 


Output Total 195 460 


 


Balance 1255 -460 














- Physical Exam


General Appearance: alert, no apparent distress


EENT: other (False teeth, no thrush)


Extremities: other (Wound VAC in place over dorsum of left foot.  No 

surrounding cellulitis.  PICC line right upper extremity looks fine.), No 

erythema





ICD10 Worksheet


Patient Problems: 


 Problems











Problem Status Diagnosed


 


Closed compression fracture of L1 lumbar vertebral body Acute 


 


Compression fracture of L3 lumbar vertebra Acute 


 


Compression fracture of L4 lumbar vertebra Acute 


 


Cellulitis of left foot Acute 


 


MRSA (methicillin resistant Staphylococcus aureus) Acute 12/30/16

## 2017-01-28 NOTE — NEUSURGPN
Date of Surgery: 01/25/17


Post Op Day: 3


Assessment/Plan: 


70 y/o male with lumbar stenosis and lumbar compression fractures (L4 and L5). 

Now POD #1 L3-L5 laminectomy, L4 and L5 kyphoplasty





-Advance diet as tolerated


-PT/OT, he is not ready for d/c yet


-Optimize pain management, it is much improved


-RANDY d/c today


-SIlver dressing: continue for 7 days post op adn then change tp dressing daily 

dressing changes. 


-DVT prophx: TEDs, SCDs, Lovenox/hep Sq okay to restart this evening


-Please notify SN with any change in neuro/motor exam





Subjective: 


mild to moderate pain, much improved but not ready to go home


Objective: 


NAD A&Ox3 MAEx4, 5/5 and equal in BUE and BLE. RANDY drain serosanguineous





- Physician


Patient Seen by : Kasey





Neurosurgery Physical Exam





- Vitals, I&O, Labs





 I and O











 01/27/17 01/28/17 01/29/17





 05:59 05:59 05:59


 


Intake Total 1450  


 


Output Total 195 460 


 


Balance 1255 -460 


 


Intake:   


 


  Oral (ml) 200  


 


  IV Intake (ml) 1000  


 


  IV Infused (ml) 250  


 


    Vancomycin HCl/Normal 250  





    Saline 250 ml @ 250 mls/   





    hr IV Q24H [facility] Rx#:   





    [id number]   


 


Output:   


 


  Urine (ml)  400 


 


    Urinal  400 


 


  Estimated Blood Loss (ml) 80  


 


  Wound Drainage (ml) 115 60 


 


    Back Chuck Chavez 115 60 


 


Other:   


 


  Intake Quantity Yes Yes 





  Sufficient   


 


  Number of Voids   


 


    Urinal  1 


 


    Bedside Commode 1  


 


  Number of Stools   


 


    Incontinence  1 


 


    Bedside Commode 1  








 Vital Signs











Temp Pulse Resp BP Pulse Ox


 


 36.4 C   57 L  11 L  142/78 H  98 


 


 01/28/17 07:42  01/28/17 07:42  01/28/17 07:42  01/28/17 07:42  01/28/17 07:42








 Laboratory Results





 01/27/17 05:40 





 01/27/17 05:40 











ICD10 Worksheet


Patient Problems: 


 Problems











Problem Status Diagnosed


 


Closed compression fracture of L1 lumbar vertebral body Acute 


 


Compression fracture of L3 lumbar vertebra Acute 


 


Compression fracture of L4 lumbar vertebra Acute 


 


Cellulitis of left foot Acute 


 


MRSA (methicillin resistant Staphylococcus aureus) Acute 12/30/16

## 2017-01-29 LAB
ALBUMIN SERPL-MCNC: 2.7 G/DL (ref 3.5–5)
ALP SERPL-CCNC: 131 IU/L (ref 38–126)
ALT SERPL-CCNC: 35 IU/L (ref 21–72)
ANION GAP SERPL CALC-SCNC: 7 MEQ/L (ref 8–16)
AST SERPL-CCNC: 30 IU/L (ref 17–59)
BILIRUB SERPL-MCNC: 0.4 MG/DL (ref 0.1–1.4)
CALCIUM SERPL-MCNC: 8.7 MG/DL (ref 8.5–10.4)
CHLORIDE SERPL-SCNC: 110 MEQ/L (ref 97–110)
CO2 SERPL-SCNC: 26 MEQ/L (ref 22–31)
CREAT SERPL-MCNC: 1 MG/DL (ref 0.7–1.3)
GFR SERPL CREATININE-BSD FRML MDRD: > 60 ML/MIN/{1.73_M2}
GLUCOSE SERPL-MCNC: 84 MG/DL (ref 70–100)
POTASSIUM SERPL-SCNC: 4.1 MEQ/L (ref 3.5–5.2)
PROT SERPL-MCNC: 5.1 G/DL (ref 6.3–8.2)
SODIUM SERPL-SCNC: 143 MEQ/L (ref 134–144)

## 2017-01-29 RX ADMIN — FUROSEMIDE SCH MG: 40 TABLET ORAL at 08:58

## 2017-01-29 RX ADMIN — PANTOPRAZOLE SODIUM SCH MG: 40 TABLET, DELAYED RELEASE ORAL at 08:57

## 2017-01-29 RX ADMIN — IPRATROPIUM BROMIDE AND ALBUTEROL SULFATE SCH ML: .5; 3 SOLUTION RESPIRATORY (INHALATION) at 16:44

## 2017-01-29 RX ADMIN — DOCUSATE SODIUM AND SENNOSIDES SCH: 50; 8.6 TABLET ORAL at 08:59

## 2017-01-29 RX ADMIN — INSULIN LISPRO SCH: 100 INJECTION, SOLUTION INTRAVENOUS; SUBCUTANEOUS at 08:58

## 2017-01-29 RX ADMIN — FLUTICASONE PROPIONATE AND SALMETEROL SCH PUFFS: 50; 500 POWDER RESPIRATORY (INHALATION) at 09:16

## 2017-01-29 RX ADMIN — MONTELUKAST SODIUM SCH MG: 10 TABLET, COATED ORAL at 18:19

## 2017-01-29 RX ADMIN — IPRATROPIUM BROMIDE AND ALBUTEROL SULFATE SCH ML: .5; 3 SOLUTION RESPIRATORY (INHALATION) at 21:35

## 2017-01-29 RX ADMIN — ATORVASTATIN CALCIUM SCH MG: 20 TABLET, FILM COATED ORAL at 20:58

## 2017-01-29 RX ADMIN — ENOXAPARIN SODIUM SCH MG: 100 INJECTION SUBCUTANEOUS at 08:57

## 2017-01-29 RX ADMIN — IRON SUPPLEMENT SCH MG: 325 TABLET ORAL at 08:57

## 2017-01-29 RX ADMIN — NYSTATIN SCH APP: 100000 POWDER TOPICAL at 21:01

## 2017-01-29 RX ADMIN — OXYCODONE HYDROCHLORIDE AND ACETAMINOPHEN PRN TAB: 5; 325 TABLET ORAL at 08:57

## 2017-01-29 RX ADMIN — DOCUSATE SODIUM AND SENNOSIDES SCH: 50; 8.6 TABLET ORAL at 21:01

## 2017-01-29 RX ADMIN — Medication SCH UNITS: at 20:58

## 2017-01-29 RX ADMIN — NYSTATIN SCH APP: 100000 POWDER TOPICAL at 09:16

## 2017-01-29 RX ADMIN — FAMOTIDINE SCH MG: 20 TABLET, FILM COATED ORAL at 20:59

## 2017-01-29 RX ADMIN — IPRATROPIUM BROMIDE AND ALBUTEROL SULFATE SCH: .5; 3 SOLUTION RESPIRATORY (INHALATION) at 05:30

## 2017-01-29 RX ADMIN — LOSARTAN POTASSIUM SCH MG: 50 TABLET, FILM COATED ORAL at 20:59

## 2017-01-29 RX ADMIN — ERTAPENEM SODIUM SCH MLS: 1 INJECTION, POWDER, LYOPHILIZED, FOR SOLUTION INTRAMUSCULAR; INTRAVENOUS at 20:58

## 2017-01-29 RX ADMIN — IPRATROPIUM BROMIDE AND ALBUTEROL SULFATE SCH: .5; 3 SOLUTION RESPIRATORY (INHALATION) at 11:40

## 2017-01-29 RX ADMIN — IPRATROPIUM BROMIDE AND ALBUTEROL SULFATE SCH ML: .5; 3 SOLUTION RESPIRATORY (INHALATION) at 13:53

## 2017-01-29 RX ADMIN — FAMOTIDINE SCH MG: 20 TABLET, FILM COATED ORAL at 08:58

## 2017-01-29 RX ADMIN — INSULIN LISPRO SCH: 100 INJECTION, SOLUTION INTRAVENOUS; SUBCUTANEOUS at 13:51

## 2017-01-29 RX ADMIN — OXYCODONE HYDROCHLORIDE AND ACETAMINOPHEN PRN TAB: 5; 325 TABLET ORAL at 18:19

## 2017-01-29 RX ADMIN — Medication SCH MLS: at 08:59

## 2017-01-29 RX ADMIN — ALLOPURINOL SCH MG: 100 TABLET ORAL at 08:58

## 2017-01-29 RX ADMIN — FLUTICASONE PROPIONATE AND SALMETEROL SCH PUFFS: 50; 500 POWDER RESPIRATORY (INHALATION) at 21:36

## 2017-01-29 RX ADMIN — NYSTATIN SCH APP: 100000 POWDER TOPICAL at 15:43

## 2017-01-29 NOTE — NEUSURGPN
Date of Surgery: 01/26/17


Post Op Day: 3


Assessment/Plan: 


72 y/o male with lumbar stenosis and lumbar compression fractures (L4 and L5). 

Now POD #3 L3-L5 laminectomy, L4 and L5 kyphoplasty





-PT/OT


-Optimize pain management, it is much improved


-SIlver dressing: continue for 7 days post op adn then change tp dressing daily 

dressing changes.


-pt may shower with dressing covered 


-DVT prophx: TEDs, SCDs, Lovenox/hep Sq okay to restart this evening


-d/c planning for rehab vs. snf


-Please notify SN with any change in neuro/motor exam





Subjective: 


no complaints, back pain is improved


Objective: 


AAOx3


strength full, sensation normal


dressing intact





- Physician


Patient Seen by : Kasey





Neurosurgery Physical Exam





- Vitals, I&O, Labs





 I and O











 01/28/17 01/29/17 01/30/17





 05:59 05:59 05:59


 


Output Total 460 500 


 


Balance -460 -500 


 


Output:   


 


  Urine (ml) 400 500 


 


    Urinal 400 500 


 


  Wound Drainage (ml) 60  


 


    Back Chuck Chavez 60  


 


Other:   


 


  Intake Quantity Yes Yes 





  Sufficient   


 


  Number of Voids   


 


    Urinal 1 1 


 


    Bedside Commode  1 


 


  Number of Stools   


 


    Incontinence 1  


 


    Bedside Commode  1 








 Vital Signs











Temp Pulse Resp BP Pulse Ox


 


 36.4 C   61   14   129/63 H  97 


 


 01/29/17 07:34  01/29/17 07:34  01/29/17 07:34  01/29/17 07:34  01/29/17 07:34








 Laboratory Results





 01/27/17 05:40 





 01/29/17 04:40 











ICD10 Worksheet


Patient Problems: 


 Problems











Problem Status Diagnosed


 


Closed compression fracture of L1 lumbar vertebral body Acute 


 


Compression fracture of L3 lumbar vertebra Acute 


 


Compression fracture of L4 lumbar vertebra Acute 


 


Cellulitis of left foot Acute 


 


MRSA (methicillin resistant Staphylococcus aureus) Acute 12/30/16

## 2017-01-29 NOTE — HOSPPROG
Hospitalist Progress Note


Assessment/Plan: 


Patient is a 71-year-old male presented to the emergency room with worsening 

low back pain with right lower extremity weakness.  





#. Lumbar back pain  after sustaining a fall


*   S/p L3-L4 decompressive laminectomy, L4 & L5 kyphoplasty





#. chronic hypoxemic respiratory failure


*  at his baseline on 4 L


*  chest x-ray shows some concern for possible pneumonia.  Patient is not 

febrile and does not have any signs or symptoms of pneumonia.





#.  anemia


*  hemoglobin /hematocrit are stable





#.  chronic COPD / no exacerbation


*  on chronic steroids / low-dose prednisone





#. Prediabetes


* hold metformin


* patient doesn't want glucoses checked and doesn't want insulin


* will dc both per his requests/glucose stable today





#.  gait instability


*    Physical therapy and occupational therapy are working with him





#.  left diabetic foot ulcer/osteomyelitis


* wound VAC in place


*  on vancomycin and ertapenem


*  wound Care following / will have dressing changes every other day





#. HTN


* cont home meds


*   a bit elevated this morning





#. bradycardia


*  asymptomatic





#. Gout


* allopurinol





#Atrial fibrillation/ history of this


* he is in sinus rhythm


* Restart ASA after surgery when ok by NSGY





#DVT ppx:  low-molecular weight heparin





#  plan.  wound vac  / Dr Sykes to see patient tomorrow who knows the patient/ 

he only wants to go home, but will be difficult with stairs and wound vac.  Also

, is on IV abx. 





Subjective: Terence is feeling well/ has no complaints.


Objective: 


 Vital Signs











Temp Pulse Resp BP Pulse Ox


 


 36.4 C   61   14   129/63 H  97 


 


 01/29/17 07:34  01/29/17 07:34  01/29/17 07:34  01/29/17 07:34  01/29/17 07:34








 Laboratory Results





 01/27/17 05:40 





 01/29/17 04:40 





 











 01/28/17 01/29/17 01/30/17





 05:59 05:59 05:59


 


Output Total 460 500 300


 


Balance -460 -500 -300








 











PT  14.4 SEC (12.0-15.0)   01/24/17  04:50    


 


INR  1.13  (0.83-1.16)   01/24/17  04:50    














- Physical Exam


Constitutional: no apparent distress, appears nourished, not in pain, 

chronically ill appearing


Eyes: PERRL


Ears, Nose, Mouth, Throat: hearing normal


Cardiovascular: regular rate and rhythym


Respiratory: no respiratory distress, reduced air movement (bibasilar)


Gastrointestinal: normoactive bowel sounds


Skin: warm, other (wound vac on left foot/toes reddened and cool to the touch)


Musculoskeletal: generalized weakness


Neurologic: AAOx3


Psychiatric: interacting appropriately





ICD10 Worksheet


Patient Problems: 


 Problems











Problem Status Diagnosed


 


Closed compression fracture of L1 lumbar vertebral body Acute 


 


Compression fracture of L3 lumbar vertebra Acute 


 


Compression fracture of L4 lumbar vertebra Acute 


 


Cellulitis of left foot Acute 


 


MRSA (methicillin resistant Staphylococcus aureus) Acute 12/30/16

## 2017-01-30 RX ADMIN — IRON SUPPLEMENT SCH MG: 325 TABLET ORAL at 08:54

## 2017-01-30 RX ADMIN — IPRATROPIUM BROMIDE AND ALBUTEROL SULFATE SCH ML: .5; 3 SOLUTION RESPIRATORY (INHALATION) at 21:45

## 2017-01-30 RX ADMIN — FAMOTIDINE SCH MG: 20 TABLET, FILM COATED ORAL at 08:54

## 2017-01-30 RX ADMIN — ALLOPURINOL SCH MG: 100 TABLET ORAL at 08:54

## 2017-01-30 RX ADMIN — DOCUSATE SODIUM AND SENNOSIDES SCH: 50; 8.6 TABLET ORAL at 23:44

## 2017-01-30 RX ADMIN — NYSTATIN SCH APP: 100000 POWDER TOPICAL at 22:37

## 2017-01-30 RX ADMIN — NYSTATIN SCH APP: 100000 POWDER TOPICAL at 17:58

## 2017-01-30 RX ADMIN — PANTOPRAZOLE SODIUM SCH MG: 40 TABLET, DELAYED RELEASE ORAL at 08:54

## 2017-01-30 RX ADMIN — Medication SCH MLS: at 08:49

## 2017-01-30 RX ADMIN — Medication SCH UNITS: at 22:41

## 2017-01-30 RX ADMIN — IPRATROPIUM BROMIDE AND ALBUTEROL SULFATE SCH ML: .5; 3 SOLUTION RESPIRATORY (INHALATION) at 04:59

## 2017-01-30 RX ADMIN — IPRATROPIUM BROMIDE AND ALBUTEROL SULFATE SCH: .5; 3 SOLUTION RESPIRATORY (INHALATION) at 17:59

## 2017-01-30 RX ADMIN — NYSTATIN SCH: 100000 POWDER TOPICAL at 11:28

## 2017-01-30 RX ADMIN — DOCUSATE SODIUM AND SENNOSIDES SCH TAB: 50; 8.6 TABLET ORAL at 08:54

## 2017-01-30 RX ADMIN — FLUTICASONE PROPIONATE AND SALMETEROL SCH PUFFS: 50; 500 POWDER RESPIRATORY (INHALATION) at 21:45

## 2017-01-30 RX ADMIN — FLUTICASONE PROPIONATE AND SALMETEROL SCH PUFFS: 50; 500 POWDER RESPIRATORY (INHALATION) at 10:17

## 2017-01-30 RX ADMIN — ENOXAPARIN SODIUM SCH MG: 100 INJECTION SUBCUTANEOUS at 08:54

## 2017-01-30 RX ADMIN — FUROSEMIDE SCH MG: 40 TABLET ORAL at 08:54

## 2017-01-30 RX ADMIN — ATORVASTATIN CALCIUM SCH MG: 20 TABLET, FILM COATED ORAL at 22:46

## 2017-01-30 RX ADMIN — HYDROCODONE BITARTRATE AND ACETAMINOPHEN PRN TAB: 5; 325 TABLET ORAL at 08:54

## 2017-01-30 RX ADMIN — MONTELUKAST SODIUM SCH: 10 TABLET, COATED ORAL at 23:44

## 2017-01-30 RX ADMIN — ERTAPENEM SODIUM SCH MLS: 1 INJECTION, POWDER, LYOPHILIZED, FOR SOLUTION INTRAMUSCULAR; INTRAVENOUS at 22:46

## 2017-01-30 RX ADMIN — FAMOTIDINE SCH MG: 20 TABLET, FILM COATED ORAL at 22:39

## 2017-01-30 RX ADMIN — LOSARTAN POTASSIUM SCH MG: 50 TABLET, FILM COATED ORAL at 22:39

## 2017-01-30 RX ADMIN — IPRATROPIUM BROMIDE AND ALBUTEROL SULFATE SCH ML: .5; 3 SOLUTION RESPIRATORY (INHALATION) at 10:17

## 2017-01-30 NOTE — PCMIDPN
Assessment/Plan: 


Assessment:


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  Patient is on both vancomycin and 

ertapenem.  He is stable on these medications.  Plan to continue these for 6 

weeks total.  Duration starts from date of prior surgery which was 12/30/2016.  

This makes an date 2/9/2017. 


 


Lumbar spine surgery was a success.  Patient recovering appropriately from this 

procedure.  Maintains on both vancomycin and ertapenem.  End date unchanged.





Strongly recommended to the patient that he reconsider agreeing to go to 

rehabilitation for strengthening.  I think he is at high risk for falls at home 

considering the VAC dressing on his foot as well as recent back surgery.








Plan:


1. Continue both vancomycin and ertapenem.


2. Follow clinical course.


3. Wound dressing changes at least Q 48 hours.














Subjective: 


Patient resting comfortably in bed.  Denies any new complaint.  States that his 

back is still sore postoperatively.  Foot is doing well.


Objective: 


Vancomycin # 5


Ertapenem # 6 Vital Signs











Temp Pulse Resp BP Pulse Ox


 


 36.7 C   82   16   145/81 H  98 


 


 01/30/17 15:25  01/30/17 15:25  01/30/17 15:25  01/30/17 15:25  01/30/17 15:25








 Laboratory Results





 01/27/17 05:40 





 01/29/17 04:40 





 











 01/29/17 01/30/17 01/31/17





 05:59 05:59 05:59


 


Intake Total  850 


 


Output Total 500 800 775


 


Balance -500 50 -775














- Physical Exam


General Appearance: WD/WN, alert, no apparent distress, non-toxic


Respiratory: lungs clear, normal breath sounds, No respiratory distress


Cardiac/Chest: regular rate, rhythm, No tachycardia


Skin: normal color, warm/dry, No rash


Neuro/Psych: alert, normal mood/affect, oriented x 3





ICD10 Worksheet


Patient Problems: 


 Problems











Problem Status Diagnosed


 


Closed compression fracture of L1 lumbar vertebral body Acute 


 


Compression fracture of L3 lumbar vertebra Acute 


 


Compression fracture of L4 lumbar vertebra Acute 


 


Cellulitis of left foot Acute 


 


MRSA (methicillin resistant Staphylococcus aureus) Acute 12/30/16

## 2017-01-30 NOTE — SOAPPROG
SOAP Progress Note


Assessment/Plan: 


Assessment:





72yo male s/p saucerization left foot, VAC placed Friday





No new pain, leg feels fine





PE


VAC removed, left medial foot wound small amount of granulation tissue, no 

surrounding erythema or fluctuance





Plan:


continue VAC changes


when discharged pt will F/U in our office as originally planned 


01/30/17 09:41





Objective: 





 Vital Signs











Temp Pulse Resp BP Pulse Ox


 


 36.6 C   71   16   145/69 H  96 


 


 01/30/17 07:38  01/30/17 07:38  01/30/17 07:38  01/30/17 07:38  01/30/17 07:38








 Laboratory Results





 01/27/17 05:40 





 01/29/17 04:40 





 











 01/29/17 01/30/17 01/31/17





 05:59 05:59 05:59


 


Intake Total  850 


 


Output Total 500 800 250


 


Balance -500 50 -250








 











PT  14.4 SEC (12.0-15.0)   01/24/17  04:50    


 


INR  1.13  (0.83-1.16)   01/24/17  04:50    














ICD10 Worksheet


Patient Problems: 


 Problems











Problem Status Diagnosed


 


Closed compression fracture of L1 lumbar vertebral body Acute 


 


Compression fracture of L3 lumbar vertebra Acute 


 


Compression fracture of L4 lumbar vertebra Acute 


 


Cellulitis of left foot Acute 


 


MRSA (methicillin resistant Staphylococcus aureus) Acute 12/30/16

## 2017-01-30 NOTE — SOAPPROG
SOAP Progress Note


Assessment/Plan: 


Assessment: 70 yo M POD #4 L3-5 laminectomy and L4, L5 kyphoplasty


























Plan:


stable and doing well overall :)


PT/OT


scd/roseann/lovenox for dvt prophylaxis


dc planner to eval dc options


please call with neuro changes


discussed with Dr Basilio 





01/30/17 11:28





Subjective: 


back pain improving, no leg pain 


Objective: 





 Vital Signs











Temp Pulse Resp BP Pulse Ox


 


 36.6 C   71   16   145/69 H  96 


 


 01/30/17 07:38  01/30/17 07:38  01/30/17 07:38  01/30/17 07:38  01/30/17 07:38








 Laboratory Results





 01/27/17 05:40 





 01/29/17 04:40 





 











 01/29/17 01/30/17 01/31/17





 05:59 05:59 05:59


 


Intake Total  850 


 


Output Total 500 800 250


 


Balance -500 50 -250








 











PT  14.4 SEC (12.0-15.0)   01/24/17  04:50    


 


INR  1.13  (0.83-1.16)   01/24/17  04:50    








AAOX4, +FC


PERRL, EOMI, no facial droop


5/5


+ light touch


C/D/I 





ICD10 Worksheet


Patient Problems: 


 Problems











Problem Status Diagnosed


 


Closed compression fracture of L1 lumbar vertebral body Acute 


 


Compression fracture of L3 lumbar vertebra Acute 


 


Compression fracture of L4 lumbar vertebra Acute 


 


Cellulitis of left foot Acute 


 


MRSA (methicillin resistant Staphylococcus aureus) Acute 12/30/16

## 2017-01-30 NOTE — WOCRNPDOC
WOCR[facility]UAN [facility]ON Advanced Assessment Note





- Skin Integrity Problem, Advanced Assess


  ** Left First Metatarsal Head Surgical Wound/Incision


Dressing Type: Gauze


Dressing Description: Clean/Dry, Intact


Exudate Amount: Scant


Exudate Characteristic(s): Serosanguinous


Integumentary Issue Intervention: Dressing Changed


Wound Bed Constitution: Granulation Tissue (50%), Smooth Tissue (50%)


Site Odor: None


Site Measurement - Head-to-Toe Length X Width X Depth (cm): 3.2x1.4x1


Skin Integrity Problem Comment: Flushed with ns.  Skin prep and drape kristin 

wound. One piece of black foam to wound bed (small simplace) bridged to dorsal 

foot over drape.  Vac restarted at - 125 mm Hg continuous suction no leaks.  

Kris PCA in room.





  ** Left Heel Pressure Injury


Dressing Type: Open to Air


Wound Bed Constitution: Dried Exudate


Site Measurement - Head-to-Toe Length X Width X Depth (cm): 0.8x1.7x0


Skin Integrity Problem Comment: Dressing missing. Wound bed dried out. 

Discussed with Aparna Manriquez.

## 2017-01-30 NOTE — HOSPPROG
Hospitalist Progress Note


Assessment/Plan: 


Patient is a 71-year-old male presented to the emergency room with worsening 

low back pain with right lower extremity weakness.  





#. Lumbar back pain  after sustaining a fall


*   S/p L3-L4 decompressive laminectomy, L4 & L5 kyphoplasty





#. chronic hypoxemic respiratory failure


*  at his baseline on 4 L





#.  anemia


*  hemoglobin /hematocrit are stable





#.  chronic COPD / no exacerbation


*  on chronic steroids / low-dose prednisone





#. Prediabetes


* hold metformin


* patient doesn't want glucoses checked and doesn't want insulin


* this has been dc





#.  gait instability


*    Physical therapy and occupational therapy are working with him





#.  left diabetic foot ulcer/osteomyelitis


*  wound VAC replaced today (appreciate wound care RN)


*  on vancomycin and ertapenem


*  wound Care following / will have dressing changes every other day





#. HTN


* cont home meds


*   a bit elevated this morning





#. bradycardia


*  asymptomatic





#. Gout


* allopurinol





#Atrial fibrillation/ history of this


* he is in sinus rhythm


* Restart ASA after surgery when ok by NSGY





#DVT ppx:  low-molecular weight heparin





#  plan.  cont current treatment/ patient only wants to go home/ unlikely to be 

able to do stairs with wound vac in place/ also, receiving IV abx. CM has 

spoken to him in regards to this. He was declining as of yesterday. DC pending 

per neurosurgery and ID.





Subjective: Terence said his foot is feeling better/ back is better.


Objective: 


 Vital Signs











Temp Pulse Resp BP Pulse Ox


 


 36.6 C   71   16   145/69 H  96 


 


 01/30/17 07:38  01/30/17 07:38  01/30/17 07:38  01/30/17 07:38  01/30/17 07:38








 Laboratory Results





 01/27/17 05:40 





 01/29/17 04:40 





 











 01/29/17 01/30/17 01/31/17





 05:59 05:59 05:59


 


Intake Total  850 


 


Output Total 500 800 500


 


Balance -500 50 -500








 











PT  14.4 SEC (12.0-15.0)   01/24/17  04:50    


 


INR  1.13  (0.83-1.16)   01/24/17  04:50    














- Physical Exam


Constitutional: no apparent distress, not in pain, chronically ill appearing


Eyes: PERRL


Ears, Nose, Mouth, Throat: hearing normal


Cardiovascular: regular rate and rhythym


Respiratory: no respiratory distress, reduced air movement (bibasilar)


Gastrointestinal: normoactive bowel sounds


Skin: warm, other (left foot w wound vac in place/ toes warm)


Musculoskeletal: generalized weakness


Neurologic: AAOx3


Psychiatric: interacting appropriately





ICD10 Worksheet


Patient Problems: 


 Problems











Problem Status Diagnosed


 


Closed compression fracture of L1 lumbar vertebral body Acute 


 


Compression fracture of L3 lumbar vertebra Acute 


 


Compression fracture of L4 lumbar vertebra Acute 


 


Cellulitis of left foot Acute 


 


MRSA (methicillin resistant Staphylococcus aureus) Acute 12/30/16

## 2017-01-31 RX ADMIN — ALLOPURINOL SCH MG: 100 TABLET ORAL at 09:04

## 2017-01-31 RX ADMIN — IPRATROPIUM BROMIDE AND ALBUTEROL SULFATE SCH ML: .5; 3 SOLUTION RESPIRATORY (INHALATION) at 14:25

## 2017-01-31 RX ADMIN — DOCUSATE SODIUM AND SENNOSIDES SCH: 50; 8.6 TABLET ORAL at 09:06

## 2017-01-31 RX ADMIN — IPRATROPIUM BROMIDE AND ALBUTEROL SULFATE SCH ML: .5; 3 SOLUTION RESPIRATORY (INHALATION) at 20:45

## 2017-01-31 RX ADMIN — FLUTICASONE PROPIONATE AND SALMETEROL SCH PUFFS: 50; 500 POWDER RESPIRATORY (INHALATION) at 20:45

## 2017-01-31 RX ADMIN — IPRATROPIUM BROMIDE AND ALBUTEROL SULFATE SCH: .5; 3 SOLUTION RESPIRATORY (INHALATION) at 17:15

## 2017-01-31 RX ADMIN — LOSARTAN POTASSIUM SCH MG: 50 TABLET, FILM COATED ORAL at 20:36

## 2017-01-31 RX ADMIN — Medication SCH MLS: at 09:13

## 2017-01-31 RX ADMIN — IPRATROPIUM BROMIDE AND ALBUTEROL SULFATE SCH: .5; 3 SOLUTION RESPIRATORY (INHALATION) at 06:27

## 2017-01-31 RX ADMIN — IRON SUPPLEMENT SCH MG: 325 TABLET ORAL at 09:04

## 2017-01-31 RX ADMIN — NYSTATIN SCH APP: 100000 POWDER TOPICAL at 09:08

## 2017-01-31 RX ADMIN — FLUTICASONE PROPIONATE AND SALMETEROL SCH PUFFS: 50; 500 POWDER RESPIRATORY (INHALATION) at 09:15

## 2017-01-31 RX ADMIN — DOCUSATE SODIUM AND SENNOSIDES SCH: 50; 8.6 TABLET ORAL at 23:09

## 2017-01-31 RX ADMIN — NYSTATIN SCH APP: 100000 POWDER TOPICAL at 18:30

## 2017-01-31 RX ADMIN — ATORVASTATIN CALCIUM SCH MG: 20 TABLET, FILM COATED ORAL at 20:35

## 2017-01-31 RX ADMIN — MONTELUKAST SODIUM SCH MG: 10 TABLET, COATED ORAL at 18:30

## 2017-01-31 RX ADMIN — PANTOPRAZOLE SODIUM SCH MG: 40 TABLET, DELAYED RELEASE ORAL at 09:05

## 2017-01-31 RX ADMIN — NYSTATIN SCH APP: 100000 POWDER TOPICAL at 23:08

## 2017-01-31 RX ADMIN — ENOXAPARIN SODIUM SCH MG: 100 INJECTION SUBCUTANEOUS at 09:02

## 2017-01-31 RX ADMIN — FUROSEMIDE SCH MG: 40 TABLET ORAL at 09:05

## 2017-01-31 RX ADMIN — FAMOTIDINE SCH MG: 20 TABLET, FILM COATED ORAL at 09:04

## 2017-01-31 RX ADMIN — FAMOTIDINE SCH MG: 20 TABLET, FILM COATED ORAL at 20:36

## 2017-01-31 RX ADMIN — Medication SCH UNITS: at 20:35

## 2017-01-31 RX ADMIN — ERTAPENEM SODIUM SCH MLS: 1 INJECTION, POWDER, LYOPHILIZED, FOR SOLUTION INTRAMUSCULAR; INTRAVENOUS at 20:40

## 2017-01-31 NOTE — PDIAF
- Diagnosis


Diagnosis: Left foot osteomyelitis


Code Status: Full Code





- Medication Management


Discharge Medications: 


 Medications to Continue on Transfer





Prednisone 10 mg PO DAILY 07/28/11 [Last Taken 01/22/17]


SIMVASTATIN [Zocor] 40 mg PO HS 07/28/11 [Last Taken 01/22/17]


Zafirlukast [Accolate 20 MG] 20 mg PO BID@1000,2000 07/28/11 [Last Taken 01/22/ 17]


metFORMIN HCL [Glucophage 500 mg (*)] 1,000 mg PO BID 07/28/11 [Last Taken 01/22 /17]


Aspirin [Aspirin 81mg (*)] 81 mg PO HS 07/25/15 [Last Taken 01/22/17]


Cholecalciferol (Vitamin D3) [Vitamin D3] 4,000 unit PO HS 07/25/15 [Last Taken 

01/22/17]


Fluticasone/Salmeter 500/50Mcg [Advair 500/50 (*)] 1 puffs IH BID 07/25/15 [

Last Taken 01/22/17]


Albuterol [Proventil Inhaler HFA (*)] 1 - 2 puffs IH DAILY PRN 12/16/16 [Last 

Taken 01/22/17]


Allopurinol [Allopurinol 100 MG (*)] 100 mg PO DAILY 12/16/16 [Last Taken 01/22/ 17]


Ferrous Sulfate [Ferrous Sulf 325 MG (*)] 325 mg PO DAILY 12/16/16 [Last Taken 

01/22/17]


Furosemide [Lasix 40 MG (*)] 40 mg PO DAILY 12/16/16 [Last Taken 01/22/17]


Ipratropium/Albuterol [Duoneb (*)] 3 ml IH QID 12/16/16 [Last Taken 01/22/17]


Losartan Potassium [Cozaar 50 mg (*)] 50 mg PO HS 12/16/16 [Last Taken 01/22/17]


Pantoprazole Sodium [Protonix 40mg (*)] 40 mg PO DAILY 12/16/16 [Last Taken 01/ 22/17]


Potassium Cl [Klor-Con 20 meq (*)] 20 meq PO DAILY 12/16/16 [Last Taken 01/22/17

]


guaiFENesin [Mucinex 600 MG (*)] 1,200 mg PO DAILY PRN 12/16/16 [Last Taken 01/ 22/17]


Acetaminophen [Tylenol 325mg (*)] 650 mg PO Q4HRS PRN #0 tab 12/18/16 [Last 

Taken 01/22/17]


Ertapenem [INVanz] 1 gm IV DAILY #0 vial 12/18/16 [Last Taken 01/22/17]


Hydrocodone/APAP 5/325 [Norco 5/325 (*)] 1 tab PO Q4HRS PRN #20 tab 12/18/16 [

Last Taken 01/22/17]


Vancomycin 1 gm/Dextrose [Vancomycin (RX)] 1 gm IV Q24H 01/25/17 [Last Taken 01/ 22/17]


Acetaminophen [Tylenol 325mg (*)] 325 - 650 mg PO Q4HRS PRN #0 tab 01/31/17 [

Last Taken Unknown]


Dextrose 50% Syringe 25 gm IVP PRN PRN #0 syr 01/31/17 [Last Taken Unknown]


Diazepam [Valium 5 MG (*)] 2.5 - 5 mg PO QID PRN #0 tab 01/31/17 [Last Taken 

Unknown]


Ertapenem [INVanz] 1 gm IV DAILY21 #0 vial 01/31/17 [Last Taken Unknown]


Famotidine [Pepcid 20 MG (*)] 20 mg PO BID #0 tab 01/31/17 [Last Taken Unknown]


Nystatin Powder [Mycostatin Powder] 1 audelia TP TID #0 powder 01/31/17 [Last Taken 

Unknown]


Ondansetron Odt [Zofran Odt 4 mg (*)] 4 - 8 mg PO Q6HRS PRN #0 tab 01/31/17 [

Last Taken Unknown]


Sennosides/Docusate Sodium [Senokot-S] 1 - 2 tab PO BID #0 tab 01/31/17 [Last 

Taken Unknown]


Vancomycin HCl/Normal Saline [Vancomycin 1 gm (Premix)] 250 ml IV Q24H #0 bag 01 /31/17 [Last Taken Unknown]


oxyCODONE/APAP 5/325 [Percocet 5/325 (*)] 1 - 2 tab PO Q4HRS PRN #0 tab 01/31/ 17 [Last Taken Unknown]








Long Term Antibiotics: Vancomycin 1 g IV q24; ertapenem 1 g IV q24


Long Term Antibiotic Stop Date: 02/09/17


Discharge Medications: Refer to the Discharge Home Medication list for PRN 

reason.


PICC Care - Routine: Yes





- Orders


Services needed: Certified Nursing Aide, Physical Therapy, Occupational Therapy





- Labs/Radiology


CBC Date: 02/01/17


CMP Date: 02/01/17


Creatinine Date: 02/04/17


Vanco Trough Date and Time: 2/1/17 and 2/4/17


Call or Fax Lab and Imaging Results to: Dr. Sykes [phone]





- Follow Up Care


Current Providers and Referrals: 


Brittni Barnes MD [Primary Care Provider] - As per Instructions

## 2017-01-31 NOTE — NEUSURGPN
Assessment/Plan: 


70 y/o male with lumbar stenosis and lumbar compression fractures (L4 and L5). 

Now POD #4 L3-L5 laminectomy, L4 and L5 kyphoplasty





-PTOT


-Optimize pain management


-SIlver dressing: continue for 7 days post op and then change tp dressing daily 

dressing changes. 


-Discharge once cleared by medicine and bed available


-Please notify SN with any change in neuro/motor exam


Subjective: 


pain tolerable with medications, denies any new leg pain, numbness or tingling


Objective: 


NAD A&Ox3 MAEx4. Incisional dressing c/d/i





- Physician


Discussed Patient with : Praful





Neurosurgery Physical Exam





- Vitals, I&O, Labs





 I and O











 01/30/17 01/31/17 02/01/17





 05:59 05:59 05:59


 


Intake Total 850 100 


 


Output Total 800 1475 


 


Balance 50 -1375 


 


Intake:   


 


  Oral (ml) 850  


 


  IV Infused (ml)  100 


 


    Ertapenem 1 gm In Ns 100  100 





    ml @ 200 mls/hr IV   





    DAILY21 [facility] Rx#:   





    [id number]   


 


Output:   


 


  Urine (ml) 800 1475 


 


    Urinal 800 1275 


 


    Bedside Commode  200 


 


Other:   


 


  Number of Voids   


 


    Urinal 2 1 


 


    Bedside Commode 1 1 


 


  Number of Stools   


 


    Incontinence  1 1


 


    Bedside Commode 1 1 








 Vital Signs











Temp Pulse Resp BP Pulse Ox


 


 36.1 C   87   16   146/82 H  93 


 


 01/30/17 23:06  01/30/17 23:06  01/30/17 23:06  01/30/17 23:06  01/30/17 23:06








 Laboratory Results





 01/27/17 05:40 





 01/29/17 04:40 











ICD10 Worksheet


Patient Problems: 


 Problems











Problem Status Diagnosed


 


Closed compression fracture of L1 lumbar vertebral body Acute 


 


Compression fracture of L3 lumbar vertebra Acute 


 


Compression fracture of L4 lumbar vertebra Acute 


 


Cellulitis of left foot Acute 


 


MRSA (methicillin resistant Staphylococcus aureus) Acute 12/30/16

## 2017-01-31 NOTE — HOSPPROG
Hospitalist Progress Note


Assessment/Plan: 


Patient is a 71-year-old male presented to the emergency room with worsening 

low back pain with right lower extremity weakness.  





#. Lumbar back pain  after sustaining a fall


*   S/p L3-L4 decompressive laminectomy, L4 & L5 kyphoplasty





#. chronic hypoxemic respiratory failure


*  at his baseline on 4 L





#.  anemia


*  hemoglobin /hematocrit are stable





#.  chronic COPD / no exacerbation


*  on chronic steroids / low-dose prednisone





#. Prediabetes


* hold metformin


* patient doesn't want glucoses checked and doesn't want insulin


* this has been dc





#.  gait instability


*    Physical therapy and occupational therapy are working with him





#.  left diabetic foot ulcer/osteomyelitis


*  wound VAC replaced today (appreciate wound care RN)


*  on vancomycin and ertapenem


*  wound Care following / will have dressing changes every other day





#. HTN


* cont home meds


*   a bit elevated this morning





#. bradycardia


*  asymptomatic





#. Gout


* allopurinol





#Atrial fibrillation/ history of this


* he is in sinus rhythm


* Restart ASA after surgery when ok by NSGY





#DVT ppx:  low-molecular weight heparin





#  plan. patient is willing to go to SNF/ok for neurosurgery to dc today/ 

patient will need close f/u with Dr Sykes/ continue his antibiotics as ordered 

here


Subjective: Terence is feeling fine/ back pain is well managed/ He is agreeable 

to go to SNF today as long as it's for a week.


Objective: 


 Vital Signs











Temp Pulse Resp BP Pulse Ox


 


 36.1 C   87   16   146/82 H  93 


 


 01/30/17 23:06  01/30/17 23:06  01/30/17 23:06  01/30/17 23:06  01/30/17 23:06








 Laboratory Results





 01/27/17 05:40 





 01/29/17 04:40 





 











 01/30/17 01/31/17 02/01/17





 05:59 05:59 05:59


 


Intake Total 850 100 


 


Output Total 800 1475 


 


Balance 50 -1375 








 











PT  14.4 SEC (12.0-15.0)   01/24/17  04:50    


 


INR  1.13  (0.83-1.16)   01/24/17  04:50    














- Physical Exam


Constitutional: not in pain, chronically ill appearing


Eyes: PERRL


Ears, Nose, Mouth, Throat: hearing normal


Cardiovascular: regular rate and rhythym


Respiratory: no respiratory distress, reduced air movement (bibasilar)


Gastrointestinal: normoactive bowel sounds


Skin: warm, other (wound vac in place on left foot)


Musculoskeletal: no muscle tenderness


Neurologic: AAOx3


Psychiatric: interacting appropriately, not anxious, not encephalopathic





ICD10 Worksheet


Patient Problems: 


 Problems











Problem Status Diagnosed


 


Closed compression fracture of L1 lumbar vertebral body Acute 


 


Compression fracture of L3 lumbar vertebra Acute 


 


Compression fracture of L4 lumbar vertebra Acute 


 


Cellulitis of left foot Acute 


 


MRSA (methicillin resistant Staphylococcus aureus) Acute 12/30/16

## 2017-01-31 NOTE — PDIAF
- Diagnosis


Diagnosis: Left foot osteomyelitis


Code Status: Full Code





- Medication Management


Discharge Medications: 


 Medications to Continue on Transfer





Prednisone 10 mg PO DAILY 07/28/11 [Last Taken 01/22/17]


SIMVASTATIN [Zocor] 40 mg PO HS 07/28/11 [Last Taken 01/22/17]


Zafirlukast [Accolate 20 MG] 20 mg PO BID@1000,2000 07/28/11 [Last Taken 01/22/ 17]


metFORMIN HCL [Glucophage 500 mg (*)] 1,000 mg PO BID 07/28/11 [Last Taken 01/22 /17]


Aspirin [Aspirin 81mg (*)] 81 mg PO HS 07/25/15 [Last Taken 01/22/17]


Cholecalciferol (Vitamin D3) [Vitamin D3] 4,000 unit PO HS 07/25/15 [Last Taken 

01/22/17]


Fluticasone/Salmeter 500/50Mcg [Advair 500/50 (*)] 1 puffs IH BID 07/25/15 [

Last Taken 01/22/17]


Albuterol [Proventil Inhaler HFA (*)] 1 - 2 puffs IH DAILY PRN 12/16/16 [Last 

Taken 01/22/17]


Allopurinol [Allopurinol 100 MG (*)] 100 mg PO DAILY 12/16/16 [Last Taken 01/22/ 17]


Ferrous Sulfate [Ferrous Sulf 325 MG (*)] 325 mg PO DAILY 12/16/16 [Last Taken 

01/22/17]


Furosemide [Lasix 40 MG (*)] 40 mg PO DAILY 12/16/16 [Last Taken 01/22/17]


Ipratropium/Albuterol [Duoneb (*)] 3 ml IH QID 12/16/16 [Last Taken 01/22/17]


Losartan Potassium [Cozaar 50 mg (*)] 50 mg PO HS 12/16/16 [Last Taken 01/22/17]


Pantoprazole Sodium [Protonix 40mg (*)] 40 mg PO DAILY 12/16/16 [Last Taken 01/ 22/17]


Potassium Cl [Klor-Con 20 meq (*)] 20 meq PO DAILY 12/16/16 [Last Taken 01/22/17

]


guaiFENesin [Mucinex 600 MG (*)] 1,200 mg PO DAILY PRN 12/16/16 [Last Taken 01/ 22/17]


Acetaminophen [Tylenol 325mg (*)] 650 mg PO Q4HRS PRN #0 tab 12/18/16 [Last 

Taken 01/22/17]


Ertapenem [INVanz] 1 gm IV DAILY #0 vial 12/18/16 [Last Taken 01/22/17]


Hydrocodone/APAP 5/325 [Norco 5/325 (*)] 1 tab PO Q4HRS PRN #20 tab 12/18/16 [

Last Taken 01/22/17]


Vancomycin 1 gm/Dextrose [Vancomycin 1 gm (Premix)] 1 gm IV Q24H 01/25/17 [Last 

Taken 01/22/17]








Long Term Antibiotics: Vancomycin 1 g IV q24; ertapenem 1 g IV q24


Long Term Antibiotic Stop Date: 02/09/17


Discharge Medications: Refer to the Discharge Home Medication list for PRN 

reason.


PICC Care - Routine: Yes





- Labs/Radiology


CBC Date: 02/01/17


CMP Date: 02/01/17


Creatinine Date: 02/04/17


Vanco Trough Date and Time: 2/1/17 and 2/4/17


Call or Fax Lab and Imaging Results to: Dr. Sykes [phone]





- Follow Up Care


Current Providers and Referrals: 


Brittni Barnes MD [Primary Care Provider] - As per Instructions

## 2017-02-01 VITALS — OXYGEN SATURATION: 88 %

## 2017-02-01 VITALS — RESPIRATION RATE: 14 BRPM | HEART RATE: 72 BPM

## 2017-02-01 VITALS — DIASTOLIC BLOOD PRESSURE: 64 MMHG | SYSTOLIC BLOOD PRESSURE: 120 MMHG

## 2017-02-01 VITALS — TEMPERATURE: 97.8 F

## 2017-02-01 RX ADMIN — PANTOPRAZOLE SODIUM SCH MG: 40 TABLET, DELAYED RELEASE ORAL at 09:58

## 2017-02-01 RX ADMIN — IRON SUPPLEMENT SCH MG: 325 TABLET ORAL at 09:58

## 2017-02-01 RX ADMIN — ENOXAPARIN SODIUM SCH MG: 100 INJECTION SUBCUTANEOUS at 09:57

## 2017-02-01 RX ADMIN — FLUTICASONE PROPIONATE AND SALMETEROL SCH PUFFS: 50; 500 POWDER RESPIRATORY (INHALATION) at 09:27

## 2017-02-01 RX ADMIN — HYDROCODONE BITARTRATE AND ACETAMINOPHEN PRN TAB: 5; 325 TABLET ORAL at 11:05

## 2017-02-01 RX ADMIN — NYSTATIN SCH APP: 100000 POWDER TOPICAL at 10:01

## 2017-02-01 RX ADMIN — FUROSEMIDE SCH MG: 40 TABLET ORAL at 09:57

## 2017-02-01 RX ADMIN — IPRATROPIUM BROMIDE AND ALBUTEROL SULFATE SCH: .5; 3 SOLUTION RESPIRATORY (INHALATION) at 06:09

## 2017-02-01 RX ADMIN — DOCUSATE SODIUM AND SENNOSIDES SCH TAB: 50; 8.6 TABLET ORAL at 09:57

## 2017-02-01 RX ADMIN — FAMOTIDINE SCH MG: 20 TABLET, FILM COATED ORAL at 09:57

## 2017-02-01 RX ADMIN — ALLOPURINOL SCH MG: 100 TABLET ORAL at 09:57

## 2017-02-01 NOTE — HOSPPROG
Hospitalist Progress Note


Assessment/Plan: 


Patient is a 71-year-old male presented to the emergency room with worsening 

low back pain with right lower extremity weakness. This is my first encounter 

with this patient. Chart reviewed. 





#. Lumbar back pain  after sustaining a fall


*   S/p L3-L4 decompressive laminectomy, L4 & L5 kyphoplasty





#. chronic hypoxemic respiratory failure


*  at his baseline on 4 L





#.  anemia


*  hemoglobin /hematocrit are stable





#.  chronic COPD / no exacerbation


*  on chronic steroids / low-dose prednisone





#. Prediabetes


* hold metformin


* patient doesn't want glucoses checked and doesn't want insulin


* this has been dc





#.  gait instability


*    Physical therapy and occupational therapy are working with him





#.  left diabetic foot ulcer/osteomyelitis


*  wound VAC replaced today (appreciate wound care RN)


*  on vancomycin and ertapenem


*  wound Care following / will have dressing changes every other day





#. HTN


* cont home meds


*   a bit elevated this morning





#. bradycardia


*  asymptomatic





#. Gout


* allopurinol





#Atrial fibrillation/ history of this


* he is in sinus rhythm


* Restart ASA after surgery when ok by NSGY





#DVT ppx:  low-molecular weight heparin





#  plan. patient is willing to go to SNF/Dc per neurosurgery/ patient will need 

close f/u with Dr Sykes/ continue his antibiotics as ordered here


Subjective: No specific issues. Ready for discharge.


Objective: 


 Vital Signs











Temp Pulse Resp BP Pulse Ox


 


 36.6 C   72   14   120/64   88 L


 


 02/01/17 07:30  02/01/17 09:30  02/01/17 09:30  02/01/17 08:28  02/01/17 11:30








 Laboratory Results





 01/27/17 05:40 





 01/29/17 04:40 





 











 01/31/17 02/01/17 02/02/17





 05:59 05:59 05:59


 


Intake Total 100 750 


 


Output Total 1475  250


 


Balance -1375 750 -250








 











PT  14.4 SEC (12.0-15.0)   01/24/17  04:50    


 


INR  1.13  (0.83-1.16)   01/24/17  04:50    














- Physical Exam


Constitutional: no apparent distress, appears nourished, not in pain


Eyes: PERRL, anicteric sclera, EOMI


Ears, Nose, Mouth, Throat: moist mucous membranes, hearing normal, ears appear 

normal


Cardiovascular: No JVD, No tachycardia, No edema


Respiratory: no respiratory distress, no rales or rhonchi, reduced air movement


Gastrointestinal: No tenderness, No ascites, No guarding


Skin: warm, normal color, No pressure ulcer


Musculoskeletal: no joint effusions, generalized weakness, No normal joint ROM


Neurologic: AAOx3


Psychiatric: interacting appropriately, not anxious, not encephalopathic





ICD10 Worksheet


Patient Problems: 


 Problems











Problem Status Diagnosed


 


Cellulitis of left foot Acute 


 


Closed compression fracture of L1 lumbar vertebral body Acute 


 


Compression fracture of L3 lumbar vertebra Acute 


 


Compression fracture of L4 lumbar vertebra Acute 


 


MRSA (methicillin resistant Staphylococcus aureus) Acute 12/30/16

## 2018-10-18 ENCOUNTER — HOSPITAL ENCOUNTER (OUTPATIENT)
Dept: HOSPITAL 80 - FSGY | Age: 73
Discharge: HOME | End: 2018-10-18
Attending: PODIATRIST
Payer: COMMERCIAL

## 2018-10-18 VITALS — DIASTOLIC BLOOD PRESSURE: 58 MMHG | SYSTOLIC BLOOD PRESSURE: 112 MMHG

## 2018-10-18 DIAGNOSIS — M86.9: ICD-10-CM

## 2018-10-18 DIAGNOSIS — J44.9: ICD-10-CM

## 2018-10-18 DIAGNOSIS — L97.906: ICD-10-CM

## 2018-10-18 DIAGNOSIS — I10: ICD-10-CM

## 2018-10-18 DIAGNOSIS — G47.30: ICD-10-CM

## 2018-10-18 DIAGNOSIS — E11.621: Primary | ICD-10-CM

## 2018-10-18 DIAGNOSIS — M10.9: ICD-10-CM

## 2018-10-18 PROCEDURE — 0HXMXZZ TRANSFER RIGHT FOOT SKIN, EXTERNAL APPROACH: ICD-10-PCS | Performed by: PODIATRIST

## 2018-10-18 PROCEDURE — 0Y6X0Z0 DETACHMENT AT RIGHT 5TH TOE, COMPLETE, OPEN APPROACH: ICD-10-PCS | Performed by: PODIATRIST

## 2018-10-18 PROCEDURE — 0Y6M0ZF DETACHMENT AT RIGHT FOOT, PARTIAL 5TH RAY, OPEN APPROACH: ICD-10-PCS | Performed by: PODIATRIST

## 2018-10-18 NOTE — PDANEPAE
ANE History of Present Illness





here for toe amputation 





ANE Past Medical History





- Cardiovascular History


Hx Hypertension: Yes


Hx Arrhythmias: Yes


Cardiovascular History Comment: AFIB - ONE EPISODE IN PAST - NO MEDS.  AORTIC 

ANEURYSM





- Pulmonary History


Hx COPD: Yes


Hx Oxygen in Use at Home: Yes


Hx Sleep Apnea: Yes


Sleep Apnea Screening Result - Last Documented: Positive


Pulmonary History Comment: SLEEP APNEA W/BIPAP





- Neurologic History


Hx Cerebrovascular Accident: Yes


Hx Seizures: No


Hx Dementia: No


Neurologic History Comment: TIA 10 YRS AGO





- Endocrine History


Hx Diabetes: Yes


Endocrine History Comment: PRE DIABETES





- Renal History


Hx Renal Disorders: No





- Liver History


Hx Hepatic Disorders: No





- Neurological & Psychiatric Hx


Hx Neurological and Psychiatric Disorders: No





- Cancer History


Hx Cancer: No





- Congenital Disorder History


Hx Congenital Disorders: No





- GI History


Hx Gastrointestinal Disorders: Yes


Gastrointestinal History Comment: Denies reflux, states takes pantoptazole for 

aortic aneurysm





- Other Health History


Other Health History: GOUT.  upper, lower dentures.  wears glasses





- Chronic Pain History


Chronic Pain: Yes (CHRONIC BACK PAIN)





- Surgical History


Prior Surgeries: bilat foot ulcer debridement 8/2018.  BULGING LUMBAR DISC/ 

REMOVAL, NASAL SURGERY, VASECTOMY.  FOOT SURG L





ANE Review of Systems


Review of Systems: 








- Exercise capacity


METS (RN): 2 METS





ANE Patient History





- Allergies


Allergies/Adverse Reactions: 








metoprolol Allergy (Verified 10/16/18 14:20)


 scaling skin on arms








- Home Medications


Home Medications: 








Prednisone DAILY 07/28/11 [Last Taken 1 Day Ago ~10/17/18]


SIMVASTATIN [Zocor]  07/28/11 [Last Taken 1 Day Ago ~10/17/18]


Zafirlukast [Accolate 20 MG] BID@1000,2000 07/28/11 [Last Taken 10/18/18]


metFORMIN HCL [Glucophage 500 mg (*)] BID 07/28/11 [Last Taken 1 Day Ago ~10/17/

18]


Cholecalciferol (Vitamin D3) [Vitamin D3]  07/25/15 [Last Taken 01/22/17]


Fluticasone/Salmeter 500/50Mcg [Advair 500/50 (*)] BID 07/25/15 [Last Taken 1 

Day Ago ~10/17/18]


Albuterol [Proventil Inhaler HFA (*)] PRN 12/16/16 [Last Taken 01/22/17]


Allopurinol [Allopurinol 100 MG (*)] DAILY 12/16/16 [Last Taken 1 Day Ago ~10/17

/18]


Ferrous Sulfate [Ferrous Sulf 325 MG (*)] DAILY 12/16/16 [Last Taken 01/22/17]


Furosemide [Lasix 40 MG (*)] DAILY 12/16/16 [Last Taken 1 Day Ago ~10/17/18]


Ipratropium/Albuterol [Duoneb (*)] QID 12/16/16 [Last Taken 10/18/18]


Losartan Potassium [Cozaar 50 mg (*)] HS 12/16/16 [Last Taken 10/18/18]


Pantoprazole Sodium [Protonix 40mg (*)] DAILY 12/16/16 [Last Taken 10/18/18]


Hydrocodone/APAP 5/325 [Norco 5/325 (*)] Q4HRS PRN 10/16/18 [Last Taken 10/18/18

]


Magnesium  10/16/18 [Last Taken Unknown]


Vitamin B-12  10/16/18 [Last Taken Unknown]








- NPO status


NPO Since - Liquids (Date): 10/18/18


NPO Since - Liquids (Time): 09:00


NPO Since - Solids (Date): 10/17/18


NPO Since - Solids (Time): 18:00





- Smoking Hx


Smoking Status: Former smoker





- Family Anes Hx


Family Hx Anesthesia Complications: NONE





ANE Labs/Vital Signs





- Labs


Result Diagrams: 


 10/18/18 11:10





- Vital Signs


Blood Pressure: 137/70


Heart Rate: 65


Respiratory Rate: 18


O2 Sat (%): 99


Height: 175.26 cm


Weight: 92.986 kg

## 2018-10-18 NOTE — POSTOPPROG
Post Op Note


Date of Operation: 10/18/18


Surgeon: Neil Jones


Assistant: none


Anesthesiologist: Cesar


Anesthesia: IV Sedation


Pre-op Diagnosis: osteomyelitis right 5th


Post-op Diagnosis: same


Indication: bone infection


Procedure: amputation right 5th ray


Findings: none


Inf/Abcess present in the surg proc area at time of surgery?: Yes


Depth: Deep Incisional (Fascial)


EBL: Minimal


Total fluids administered: 20cc .25% marcaine plain preop


Complications: 





none   


Drains: Other (none)

## 2018-10-19 NOTE — GOP
DATE OF OPERATION:  10/18/2018



SURGEON:  Neil Jones DPM



ASSISTANT:  None.



ANESTHESIA:  Local with MAC



ANESTHESIOLOGIST:  Dr. Guerrero.



PREOPERATIVE DIAGNOSIS:  

1.  Ulceration, right 5th metatarsal head. 

2.  Osteomyelitis, right 5th metatarsal head and proximal phalanx.



POSTOPERATIVE DIAGNOSIS:  

1.  Ulceration, right 5th metatarsal head. 

2.  Osteomyelitis, right 5th metatarsal head and proximal phalanx.



PROCEDURE PERFORMED:  

1.  Partial right 5th ray amputation including 5th digit. 

2.  Rotational flap for ulceration closure right 5th.



FINDINGS:  



SPECIMENS:  Bone specimen sent for culture and sensitivity and pathological evaluation.



ESTIMATED BLOOD LOSS:  Less than 20 cc.



DESCRIPTION OF PROCEDURE:  Patient presented to [facility].  He was cleared for the int
ended procedure.  Patient was taken to the operating room and placed in stable supine position.  IV s
edation was started per the Anesthesia Department.  Foot was anesthetized in infiltrative nerve block
 fashion.  Foot was prepped, scrubbed and draped in the usual sterile fashion.  Following exsanguinat
ion by elevation Esmarch bandage, pneumatic ankle tourniquet was inflated to 225 mmHg.  At this time,
 attention was directed to the lateral aspect of the right 5th ray where the obvious ulceration was n
oted.  At this time the ulceration was probing directly down to bone, and it was decided that amputat
ion and debridement would be necessary.  At this time, a linear incision was started proximal on the 
5th metatarsal and carried distal toward the ulceration.  Two converging semi-elliptical incisions we
re made around the ulceration and then carried obliquely over the base of the proximal phalanx of the
 5th digit.  A semi circular incision was then made on the dorsal aspect of this to act as a rotation
al flap for the ulcer closure at a later time.  The incisions were carried deep utilizing sharp, blun
t dissection, making sure that all neurovascular structures were identified and retracted at this orly
e.  All superficial bleeders were cauterized.  The incision was carried down to the level of the base
 of the proximal phalanx.  At this time, the capsulotomy was performed and the 5th digit was removed.
  There was noted to be evidence of osteomyelitic changes at the base of the proximal phalanx at this
 time.  Upon completion of this, attention was redirected to the 5th metatarsal where further erosive
 changes were noted.  The sagittal saw was then utilized to resect the 5th metatarsal at a slight ang
ulation from proximal lateral to distal medial and from dorsal distal to plantar proximal.  Upon comp
letion of this, the head of the 5th metatarsal was then removed and sent in for culture and sensitivi
ty.  I also wanted to get pathological findings, so another cut through the metatarsal was performed 
just proximal to this by approximately 5 mm.  This was performed in the same angulated fashion and th
is was sent in for evaluation for any evidence of further osteomyelitis.  The bone appeared to look h
ealthy at this time and the area was then cleaned with sterile saline and antibiotic rinse in a pulse
 lavage fashion, 3 L of sterile saline were utilized.  Upon completion of this, the tourniquet was re
leased.  No remaining devitalized tissue was appreciable. Deep closure was then obtained with 5-0 Colby
ryl followed by skin closure with 3-0 nylon and 0 Mersilene.  The rotational flap had been moved into
 place to close the ulceration, so no open sites were appreciable at this time.  The area was cleaned
 a final time with sterile saline before the area was dressed with Betadine-soaked Adaptics, 4x4s, Kl
ing, and Coban.  The patient was then taken to the recovery room, vital signs stable and vascular sup
ply intact to the remaining 4 digits.



HEMOSTASIS:  PAT at 225 mmHg by 44 minutes.



MATERIALS:  None.



INJECTABLES:  20 cc 0.25% Marcaine plain preoperatively.



COMPLICATIONS:  None.





Job #:  [id number]

## 2018-12-18 ENCOUNTER — HOSPITAL ENCOUNTER (OUTPATIENT)
Dept: HOSPITAL 80 - BHCLAF | Age: 73
End: 2018-12-18
Attending: INTERNAL MEDICINE
Payer: COMMERCIAL

## 2018-12-18 DIAGNOSIS — I48.91: Primary | ICD-10-CM

## 2019-01-03 ENCOUNTER — HOSPITAL ENCOUNTER (OUTPATIENT)
Dept: HOSPITAL 80 - FSGY | Age: 74
Discharge: HOME | End: 2019-01-03
Attending: PODIATRIST
Payer: COMMERCIAL

## 2019-01-03 VITALS — DIASTOLIC BLOOD PRESSURE: 61 MMHG | SYSTOLIC BLOOD PRESSURE: 139 MMHG

## 2019-01-03 DIAGNOSIS — E11.40: ICD-10-CM

## 2019-01-03 DIAGNOSIS — I12.9: ICD-10-CM

## 2019-01-03 DIAGNOSIS — Z86.73: ICD-10-CM

## 2019-01-03 DIAGNOSIS — E11.22: ICD-10-CM

## 2019-01-03 DIAGNOSIS — E11.621: ICD-10-CM

## 2019-01-03 DIAGNOSIS — L97.506: ICD-10-CM

## 2019-01-03 DIAGNOSIS — I71.4: ICD-10-CM

## 2019-01-03 DIAGNOSIS — M1A.9XX1: ICD-10-CM

## 2019-01-03 DIAGNOSIS — Z79.84: ICD-10-CM

## 2019-01-03 DIAGNOSIS — G47.33: ICD-10-CM

## 2019-01-03 DIAGNOSIS — M86.9: Primary | ICD-10-CM

## 2019-01-03 DIAGNOSIS — J44.9: ICD-10-CM

## 2019-01-03 DIAGNOSIS — N18.3: ICD-10-CM

## 2019-01-03 NOTE — POSTOPPROG
Post Op Note


Date of Operation: 01/03/19


Surgeon: Neil Jones


Assistant: none


Anesthesiologist: Frederic


Anesthesia: IV Sedation


Pre-op Diagnosis: osteo right th metatarsal


Post-op Diagnosis: same


Indication: infection


Procedure: partial right 5th metatarsal amputation with grafting


Findings: osteomyelitis


Inf/Abcess present in the surg proc area at time of surgery?: Yes


Depth: Deep Incisional (Fascial)


EBL: Minimal


Total fluids administered: 20cc .25% marcaine plain


Complications: 





none


Drains: Other (none)

## 2019-01-03 NOTE — PDANEPAE
ANE History of Present Illness





Right foot wound, here for I&D





ANE Past Medical History





- Cardiovascular History


Hx Hypertension: Yes


Hx Arrhythmias: Yes


Hx Chest Pain: No


Hx Coronary Artery / Peripheral Vascular Disease: No


Hx CHF / Valvular Disease: Yes


Cardiovascular History Comment: AFIB - ONE EPISODE IN PAST - NO MEDS.  AORTIC 

ANEURYSM.  30 day heart monitor in place.  chf





- Pulmonary History


Hx COPD: Yes


Hx Asthma/Reactive Airway Disease: No


Hx Oxygen in Use at Home: Yes


O2 in Use at Home (L/minute): 4.L 24/7


Hx Sleep Apnea: Yes


Sleep Apnea Screening Result - Last Documented: Positive


Pulmonary History Comment: SLEEP APNEA W/BIPAP





- Neurologic History


Hx Cerebrovascular Accident: Yes


Hx Seizures: No


Hx Dementia: No


Neurologic History Comment: TIA 10 YRS AGO





- Endocrine History


Hx Diabetes: No


Endocrine History Comment: PRE DIABETES





- Renal History


Hx Renal Disorders: Yes


Renal History Comment: RENAL INSUFFICIENCY





- Liver History


Hx Hepatic Disorders: No





- Neurological & Psychiatric Hx


Hx Neurological and Psychiatric Disorders: No





- Cancer History


Hx Cancer: No





- Congenital Disorder History


Hx Congenital Disorders: No





- GI History


Hx Gastrointestinal Disorders: Yes


Gastrointestinal History Comment: Denies reflux, states takes pantoptazole for 

aortic aneurysm





- Other Health History


Other Health History: GOUT.  upper, lower dentures.  wears glasses





- Chronic Pain History


Chronic Pain: Yes (CHRONIC BACK PAIN)





- Surgical History


Prior Surgeries: bilat foot ulcer debridement 8/2018.  BULGING LUMBAR DISC/ 

REMOVAL, NASAL SURGERY, VASECTOMY.  FOOT SURG L





ANE Review of Systems


Review of Systems: 








- Exercise capacity


METS (RN): 1 METS





ANE Patient History





- Allergies


Allergies/Adverse Reactions: 








metoprolol Allergy (Verified 10/16/18 14:20)


 scaling skin on arms








- Home Medications


Home Medications: 








Prednisone  07/28/11 [Last Taken 1 Day Ago ~10/17/18]


SIMVASTATIN [Zocor]  07/28/11 [Last Taken 1 Day Ago ~10/17/18]


Zafirlukast [Accolate 20 MG]  07/28/11 [Last Taken 10/18/18]


Cholecalciferol (Vitamin D3) [Vitamin D3]  07/25/15 [Last Taken 01/22/17]


Fluticasone/Salmeter 500/50Mcg [Advair 500/50 (*)]  07/25/15 [Last Taken 1 Day 

Ago ~10/17/18]


Albuterol [Proventil Inhaler HFA (*)]  12/16/16 [Last Taken 01/22/17]


Allopurinol [Allopurinol 100 MG (*)]  12/16/16 [Last Taken 1 Day Ago ~10/17/18]


Ferrous Sulfate [Ferrous Sulf 325 MG (*)]  12/16/16 [Last Taken 01/22/17]


Furosemide [Lasix 40 MG (*)]  12/16/16 [Last Taken 1 Day Ago ~10/17/18]


Ipratropium/Albuterol [Duoneb (*)]  12/16/16 [Last Taken 10/18/18]


Losartan Potassium [Cozaar 50 mg (*)]  12/16/16 [Last Taken 10/18/18]


Pantoprazole Sodium [Protonix 40mg (*)]  12/16/16 [Last Taken 10/18/18]


Hydrocodone/APAP 5/325 [Norco 5/325 (*)]  10/16/18 [Last Taken 10/18/18]


Magnesium  10/16/18 [Last Taken Unknown]


Vitamin B-12  10/16/18 [Last Taken Unknown]








- NPO status


NPO Since - Liquids (Date): 01/02/19


NPO Since - Liquids (Time): 20:30


NPO Since - Solids (Date): 01/02/19


NPO Since - Solids (Time): 18:30





- Smoking Hx


Smoking Status: Former smoker





- Family Anes Hx


Family Hx Anesthesia Complications: NONE





ANE Labs/Vital Signs





- Vital Signs


Height: 175.26 cm


Weight: 89.811 kg





ANE Physical Exam





- Airway


Neck exam: decreased ROM, increased neck circumference


Mallampati Score: Class 3


Mouth exam: dentures





- Pulmonary


Pulmonary: no respiratory distress, no rales or rhonchi





- Cardiovascular


Cardiovascular: regular rate and rhythym, no murmur, rub, or gallop





- ASA Status


ASA Status: III





ANE Anesthesia Plan


Anesthesia Plan: GA with mask


Regional Anesthesia: single shot NB


Total IV Anesthesia: Yes

## 2019-01-03 NOTE — GOP
DATE OF OPERATION:  01/03/2019



SURGEON:  Neil Jones DPM



ASSISTANT:  None.



ANESTHESIA:  Local with MAC.



ANESTHESIOLOGIST:  Dr. De La Garza.



PREOPERATIVE DIAGNOSIS:  

1.  Ulceration, right lateral foot.

2.  Osteomyelitis, right 5th metatarsal.



POSTOPERATIVE DIAGNOSIS:  

1.  Ulceration, right lateral foot.

2.  Osteomyelitis, right 5th metatarsal.



PROCEDURE PERFORMED:  

1.  Debridement ulceration right lateral foot including excision of 5th metatarsal.

2.  Collagen grafting right foot.



FINDINGS:  



SPECIMENS:  Bone specimen sent for culture and sensitivity and crystals sent for analysis.



ESTIMATED BLOOD LOSS:  Minimal.



DESCRIPTION OF PROCEDURE:  The patient presented to [facility], was cleared for the int
ended procedure.  Patient was taken to the operating room and placed on the table in supine position.
  IV sedation was started per the anesthesia department.  Foot was anesthetized in an infiltrative ne
rve block fashion.  Foot was prepped, scrubbed and draped in usual sterile fashion.  Following exsang
uination by elevation of Esmarch bandage, pneumatic ankle tourniquet was inflated to 250 mmHg.  At th
is time, attention was directed to the lateral aspect of the right foot where the obvious ulcer was i
dentified.  The bone was visible at this time.  It was decided that this would need to be excised.  T
he ulceration was incised proximally into healthy tissue.  The dissection was carried deep, utilizing
 sharp and blunt dissection, making sure that all neurovascular structures were identified and retrac
roseann.  At this time all superficial bleeders were cauterized.  The incision was carried down to the le
luis miguel of the base of the 5th metatarsal.  The 5th metatarsal was noted to be substantially soft and ero
ded.  At this time, it was decided that the remaining 5th metatarsal would be excised and removed.  I
t was freed up at the metatarsal cuboid joint and removed and sent in for pathological evaluation.  A
t this time considerable white crystalline deposits in the area were appreciable.  This was most cons
istent with gout but some of these crystals were taken and sent in for crystal analysis.  Upon comple
tion of this, the Versajet was then utilized to remove any devitalized tissue from the site.  The are
a was then rinsed with 3 L of sterile saline and antibiotic rinse in a pulse lavage fashion.  Upon co
mpletion of this, the deep void from the excision of the 5th metatarsal was closed utilizing 3-0 Vicr
yl.  It was decided that the ulcer itself would be grafted with a 5 x 9 collagen meshed graft.  This 
was stapled onto the site appropriately before the area was dressed with Adaptic, 4x4s, Kerlix and Co
ban.  



The patient was taken to recovery room, vital signs stable, vascular supply intact to the remaining d
igits.



HEMOSTASIS:  PAT at 250 mmHg by 41 minutes.



MATERIALS:  5 x 9 cm meshed collagen graft.



INJECTABLES:  20 cc 0.25% Marcaine plain preoperatively.



COMPLICATIONS:  None.





Job #:  [id number]
